# Patient Record
Sex: FEMALE | Race: WHITE | NOT HISPANIC OR LATINO
[De-identification: names, ages, dates, MRNs, and addresses within clinical notes are randomized per-mention and may not be internally consistent; named-entity substitution may affect disease eponyms.]

---

## 2022-04-29 PROBLEM — Z00.00 ENCOUNTER FOR PREVENTIVE HEALTH EXAMINATION: Status: ACTIVE | Noted: 2022-04-29

## 2022-11-28 ENCOUNTER — APPOINTMENT (OUTPATIENT)
Dept: ORTHOPEDIC SURGERY | Facility: CLINIC | Age: 52
End: 2022-11-28

## 2022-11-28 VITALS — HEIGHT: 65 IN | WEIGHT: 215 LBS | BODY MASS INDEX: 35.82 KG/M2

## 2022-11-28 DIAGNOSIS — S49.92XA UNSPECIFIED INJURY OF LEFT SHOULDER AND UPPER ARM, INITIAL ENCOUNTER: ICD-10-CM

## 2022-11-28 PROCEDURE — 99213 OFFICE O/P EST LOW 20 MIN: CPT

## 2022-11-28 PROCEDURE — 73030 X-RAY EXAM OF SHOULDER: CPT | Mod: LT

## 2022-11-28 PROCEDURE — 99203 OFFICE O/P NEW LOW 30 MIN: CPT

## 2022-11-28 RX ORDER — ALLOPURINOL 200 MG/1
TABLET ORAL
Refills: 0 | Status: ACTIVE | COMMUNITY

## 2022-11-28 RX ORDER — CYCLOSPORINE 25 MG/1
CAPSULE, GELATIN COATED ORAL
Refills: 0 | Status: ACTIVE | COMMUNITY

## 2022-11-28 RX ORDER — OMEPRAZOLE MAGNESIUM 20 MG/1
20 TABLET, DELAYED RELEASE ORAL
Refills: 0 | Status: ACTIVE | COMMUNITY

## 2022-11-28 RX ORDER — PREDNISONE 50 MG/1
TABLET ORAL
Refills: 0 | Status: ACTIVE | COMMUNITY

## 2022-11-28 NOTE — IMAGING
[de-identified] :  On examination of her left shoulder she has a small area of ecchymosis, no erythema, mild swelling.  She is nontender over the clavicle or the AC joint.  She is tender over the anterior glenohumeral joint and the greater tuberosity.  She has limited range of motion of the shoulder.  4/5 strength with abduction and adduction, sensation is intact throughout, 2+ radial pulse.\par \par X-rays taken in the office today of the left shoulder show no acute fractures, dislocations, or other bony abnormalities.

## 2022-11-28 NOTE — DISCUSSION/SUMMARY
[de-identified] :  At this time she is going to for continue to rest and ice the shoulder, Tylenol as needed for pain.  I would like to get an MRI of her shoulder to rule out a rotator cuff injury.  I gave her a script for physical therapy.  She can call me after the MRI to go over the results.  Will schedule her follow-up for repeat evaluation. Patient will call me if any other problems or concerns.  Patient verbalized understanding and agreed with the plan, all questions were answered in the office today.\par

## 2022-11-28 NOTE — HISTORY OF PRESENT ILLNESS
[de-identified] :  52-year-old female is here today for evaluation of her left shoulder.  Patient states she fell going into her house landing on her left shoulder and left side.  Since then she has been having pain and difficulty lifting her left arm.  She denies any previous injuries or surgeries on the shoulder.  She denies any pain radiating down the arm.  She denies any numbness or tingling in the arm.  She has only been taking Tylenol for the pain because she is a kidney transplant patient.

## 2022-12-05 ENCOUNTER — APPOINTMENT (OUTPATIENT)
Dept: MRI IMAGING | Facility: CLINIC | Age: 52
End: 2022-12-05

## 2022-12-29 ENCOUNTER — APPOINTMENT (OUTPATIENT)
Dept: ORTHOPEDIC SURGERY | Facility: CLINIC | Age: 52
End: 2022-12-29

## 2023-06-05 ENCOUNTER — INPATIENT (INPATIENT)
Facility: HOSPITAL | Age: 53
LOS: 1 days | Discharge: HOME | DRG: 299 | End: 2023-06-07
Attending: INTERNAL MEDICINE | Admitting: INTERNAL MEDICINE
Payer: MEDICARE

## 2023-06-05 VITALS
TEMPERATURE: 98 F | OXYGEN SATURATION: 98 % | DIASTOLIC BLOOD PRESSURE: 100 MMHG | SYSTOLIC BLOOD PRESSURE: 170 MMHG | WEIGHT: 220.02 LBS | HEART RATE: 110 BPM | HEIGHT: 66 IN | RESPIRATION RATE: 18 BRPM

## 2023-06-05 DIAGNOSIS — R55 SYNCOPE AND COLLAPSE: ICD-10-CM

## 2023-06-05 DIAGNOSIS — Z96.652 PRESENCE OF LEFT ARTIFICIAL KNEE JOINT: Chronic | ICD-10-CM

## 2023-06-05 DIAGNOSIS — Z94.0 KIDNEY TRANSPLANT STATUS: Chronic | ICD-10-CM

## 2023-06-05 DIAGNOSIS — Z96.651 PRESENCE OF RIGHT ARTIFICIAL KNEE JOINT: Chronic | ICD-10-CM

## 2023-06-05 LAB
ALBUMIN SERPL ELPH-MCNC: 4 G/DL — SIGNIFICANT CHANGE UP (ref 3.5–5.2)
ALP SERPL-CCNC: 98 U/L — SIGNIFICANT CHANGE UP (ref 30–115)
ALT FLD-CCNC: 18 U/L — SIGNIFICANT CHANGE UP (ref 0–41)
ANION GAP SERPL CALC-SCNC: 16 MMOL/L — HIGH (ref 7–14)
ANION GAP SERPL CALC-SCNC: 23 MMOL/L — HIGH (ref 7–14)
APPEARANCE UR: CLEAR — SIGNIFICANT CHANGE UP
APTT BLD: 25.6 SEC — LOW (ref 27–39.2)
APTT BLD: 25.9 SEC — LOW (ref 27–39.2)
AST SERPL-CCNC: 46 U/L — HIGH (ref 0–41)
BACTERIA # UR AUTO: ABNORMAL
BASE EXCESS BLDV CALC-SCNC: -10.3 MMOL/L — LOW (ref -2–3)
BASOPHILS # BLD AUTO: 0.03 K/UL — SIGNIFICANT CHANGE UP (ref 0–0.2)
BASOPHILS NFR BLD AUTO: 0.3 % — SIGNIFICANT CHANGE UP (ref 0–1)
BILIRUB SERPL-MCNC: 1.2 MG/DL — SIGNIFICANT CHANGE UP (ref 0.2–1.2)
BILIRUB UR-MCNC: NEGATIVE — SIGNIFICANT CHANGE UP
BUN SERPL-MCNC: 35 MG/DL — HIGH (ref 10–20)
BUN SERPL-MCNC: 36 MG/DL — HIGH (ref 10–20)
BUN SERPL-MCNC: 37 MG/DL — HIGH (ref 10–20)
BUN SERPL-MCNC: 40 MG/DL — HIGH (ref 10–20)
CA-I SERPL-SCNC: 1.23 MMOL/L — SIGNIFICANT CHANGE UP (ref 1.15–1.33)
CALCIUM SERPL-MCNC: 9.1 MG/DL — SIGNIFICANT CHANGE UP (ref 8.4–10.5)
CALCIUM SERPL-MCNC: 9.3 MG/DL — SIGNIFICANT CHANGE UP (ref 8.4–10.5)
CALCIUM SERPL-MCNC: 9.5 MG/DL — SIGNIFICANT CHANGE UP (ref 8.4–10.5)
CALCIUM SERPL-MCNC: 9.6 MG/DL — SIGNIFICANT CHANGE UP (ref 8.4–10.4)
CHLORIDE SERPL-SCNC: 100 MMOL/L — SIGNIFICANT CHANGE UP (ref 98–110)
CHLORIDE SERPL-SCNC: 100 MMOL/L — SIGNIFICANT CHANGE UP (ref 98–110)
CHLORIDE SERPL-SCNC: 103 MMOL/L — SIGNIFICANT CHANGE UP (ref 98–110)
CHLORIDE SERPL-SCNC: 98 MMOL/L — SIGNIFICANT CHANGE UP (ref 98–110)
CK SERPL-CCNC: 369 U/L — HIGH (ref 0–225)
CO2 SERPL-SCNC: 13 MMOL/L — LOW (ref 17–32)
CO2 SERPL-SCNC: 16 MMOL/L — LOW (ref 17–32)
CO2 SERPL-SCNC: 20 MMOL/L — SIGNIFICANT CHANGE UP (ref 17–32)
CO2 SERPL-SCNC: 22 MMOL/L — SIGNIFICANT CHANGE UP (ref 17–32)
COLOR SPEC: YELLOW — SIGNIFICANT CHANGE UP
CREAT SERPL-MCNC: 2.3 MG/DL — HIGH (ref 0.7–1.5)
CREAT SERPL-MCNC: 2.3 MG/DL — HIGH (ref 0.7–1.5)
CREAT SERPL-MCNC: 2.5 MG/DL — HIGH (ref 0.7–1.5)
CREAT SERPL-MCNC: 2.5 MG/DL — HIGH (ref 0.7–1.5)
D DIMER BLD IA.RAPID-MCNC: 857 NG/ML DDU — HIGH
DIFF PNL FLD: ABNORMAL
EGFR: 23 ML/MIN/1.73M2 — LOW
EGFR: 23 ML/MIN/1.73M2 — LOW
EGFR: 25 ML/MIN/1.73M2 — LOW
EGFR: 25 ML/MIN/1.73M2 — LOW
EOSINOPHIL # BLD AUTO: 0.01 K/UL — SIGNIFICANT CHANGE UP (ref 0–0.7)
EOSINOPHIL NFR BLD AUTO: 0.1 % — SIGNIFICANT CHANGE UP (ref 0–8)
EPI CELLS # UR: 1 /HPF — SIGNIFICANT CHANGE UP (ref 0–5)
ETHANOL SERPL-MCNC: <10 MG/DL — SIGNIFICANT CHANGE UP
GAS PNL BLDV: 131 MMOL/L — LOW (ref 136–145)
GAS PNL BLDV: SIGNIFICANT CHANGE UP
GLUCOSE SERPL-MCNC: 101 MG/DL — HIGH (ref 70–99)
GLUCOSE SERPL-MCNC: 145 MG/DL — HIGH (ref 70–99)
GLUCOSE SERPL-MCNC: 84 MG/DL — SIGNIFICANT CHANGE UP (ref 70–99)
GLUCOSE SERPL-MCNC: 85 MG/DL — SIGNIFICANT CHANGE UP (ref 70–99)
GLUCOSE UR QL: NEGATIVE — SIGNIFICANT CHANGE UP
HCO3 BLDV-SCNC: 16 MMOL/L — LOW (ref 22–29)
HCT VFR BLD CALC: 35.2 % — LOW (ref 37–47)
HCT VFR BLDA CALC: 36 % — LOW (ref 39–51)
HGB BLD CALC-MCNC: 11.9 G/DL — LOW (ref 12.6–17.4)
HGB BLD-MCNC: 11.4 G/DL — LOW (ref 12–16)
IMM GRANULOCYTES NFR BLD AUTO: 0.6 % — HIGH (ref 0.1–0.3)
INR BLD: 0.93 RATIO — SIGNIFICANT CHANGE UP (ref 0.65–1.3)
INR BLD: 0.97 RATIO — SIGNIFICANT CHANGE UP (ref 0.65–1.3)
KETONES UR-MCNC: ABNORMAL
LACTATE BLDV-MCNC: 4.9 MMOL/L — CRITICAL HIGH (ref 0.5–2)
LACTATE SERPL-SCNC: 1 MMOL/L — SIGNIFICANT CHANGE UP (ref 0.7–2)
LACTATE SERPL-SCNC: 1.8 MMOL/L — SIGNIFICANT CHANGE UP (ref 0.7–2)
LEUKOCYTE ESTERASE UR-ACNC: NEGATIVE — SIGNIFICANT CHANGE UP
LYMPHOCYTES # BLD AUTO: 1.24 K/UL — SIGNIFICANT CHANGE UP (ref 1.2–3.4)
LYMPHOCYTES # BLD AUTO: 14.3 % — LOW (ref 20.5–51.1)
MCHC RBC-ENTMCNC: 32.4 G/DL — SIGNIFICANT CHANGE UP (ref 32–37)
MCHC RBC-ENTMCNC: 33.7 PG — HIGH (ref 27–31)
MCV RBC AUTO: 104.1 FL — HIGH (ref 81–99)
MONOCYTES # BLD AUTO: 0.35 K/UL — SIGNIFICANT CHANGE UP (ref 0.1–0.6)
MONOCYTES NFR BLD AUTO: 4 % — SIGNIFICANT CHANGE UP (ref 1.7–9.3)
NEUTROPHILS # BLD AUTO: 7.02 K/UL — HIGH (ref 1.4–6.5)
NEUTROPHILS NFR BLD AUTO: 80.7 % — HIGH (ref 42.2–75.2)
NITRITE UR-MCNC: NEGATIVE — SIGNIFICANT CHANGE UP
NRBC # BLD: 0 /100 WBCS — SIGNIFICANT CHANGE UP (ref 0–0)
PCO2 BLDV: 37 MMHG — LOW (ref 39–42)
PH BLDV: 7.25 — LOW (ref 7.32–7.43)
PH UR: 6 — SIGNIFICANT CHANGE UP (ref 5–8)
PLATELET # BLD AUTO: 279 K/UL — SIGNIFICANT CHANGE UP (ref 130–400)
PMV BLD: 10.9 FL — HIGH (ref 7.4–10.4)
PO2 BLDV: 37 MMHG — SIGNIFICANT CHANGE UP
POTASSIUM BLDV-SCNC: 5.8 MMOL/L — HIGH (ref 3.5–5.1)
POTASSIUM SERPL-MCNC: 4.3 MMOL/L — SIGNIFICANT CHANGE UP (ref 3.5–5)
POTASSIUM SERPL-MCNC: 4.7 MMOL/L — SIGNIFICANT CHANGE UP (ref 3.5–5)
POTASSIUM SERPL-MCNC: 5.6 MMOL/L — HIGH (ref 3.5–5)
POTASSIUM SERPL-MCNC: 5.6 MMOL/L — HIGH (ref 3.5–5)
POTASSIUM SERPL-SCNC: 4.3 MMOL/L — SIGNIFICANT CHANGE UP (ref 3.5–5)
POTASSIUM SERPL-SCNC: 4.7 MMOL/L — SIGNIFICANT CHANGE UP (ref 3.5–5)
POTASSIUM SERPL-SCNC: 5.6 MMOL/L — HIGH (ref 3.5–5)
POTASSIUM SERPL-SCNC: 5.6 MMOL/L — HIGH (ref 3.5–5)
PROT SERPL-MCNC: 7.3 G/DL — SIGNIFICANT CHANGE UP (ref 6–8)
PROT UR-MCNC: ABNORMAL
PROTHROM AB SERPL-ACNC: 10.6 SEC — SIGNIFICANT CHANGE UP (ref 9.95–12.87)
PROTHROM AB SERPL-ACNC: 11 SEC — SIGNIFICANT CHANGE UP (ref 9.95–12.87)
RBC # BLD: 3.38 M/UL — LOW (ref 4.2–5.4)
RBC # FLD: 15.1 % — HIGH (ref 11.5–14.5)
RBC CASTS # UR COMP ASSIST: 5 /HPF — HIGH (ref 0–4)
SAO2 % BLDV: 53.4 % — SIGNIFICANT CHANGE UP
SODIUM SERPL-SCNC: 134 MMOL/L — LOW (ref 135–146)
SODIUM SERPL-SCNC: 135 MMOL/L — SIGNIFICANT CHANGE UP (ref 135–146)
SODIUM SERPL-SCNC: 136 MMOL/L — SIGNIFICANT CHANGE UP (ref 135–146)
SODIUM SERPL-SCNC: 138 MMOL/L — SIGNIFICANT CHANGE UP (ref 135–146)
SP GR SPEC: 1.02 — SIGNIFICANT CHANGE UP (ref 1.01–1.03)
TROPONIN T SERPL-MCNC: 0.02 NG/ML — HIGH
TROPONIN T SERPL-MCNC: <0.01 NG/ML — SIGNIFICANT CHANGE UP
UROBILINOGEN FLD QL: SIGNIFICANT CHANGE UP
WBC # BLD: 8.7 K/UL — SIGNIFICANT CHANGE UP (ref 4.8–10.8)
WBC # FLD AUTO: 8.7 K/UL — SIGNIFICANT CHANGE UP (ref 4.8–10.8)
WBC UR QL: 1 /HPF — SIGNIFICANT CHANGE UP (ref 0–5)

## 2023-06-05 PROCEDURE — 97162 PT EVAL MOD COMPLEX 30 MIN: CPT | Mod: GP

## 2023-06-05 PROCEDURE — A9540: CPT

## 2023-06-05 PROCEDURE — 97167 OT EVAL HIGH COMPLEX 60 MIN: CPT | Mod: GO

## 2023-06-05 PROCEDURE — 73600 X-RAY EXAM OF ANKLE: CPT | Mod: 26,LT

## 2023-06-05 PROCEDURE — 72170 X-RAY EXAM OF PELVIS: CPT | Mod: 26

## 2023-06-05 PROCEDURE — 73562 X-RAY EXAM OF KNEE 3: CPT | Mod: 26,LT

## 2023-06-05 PROCEDURE — 85730 THROMBOPLASTIN TIME PARTIAL: CPT

## 2023-06-05 PROCEDURE — 36415 COLL VENOUS BLD VENIPUNCTURE: CPT

## 2023-06-05 PROCEDURE — 85025 COMPLETE CBC W/AUTO DIFF WBC: CPT

## 2023-06-05 PROCEDURE — 73030 X-RAY EXAM OF SHOULDER: CPT | Mod: 26,LT

## 2023-06-05 PROCEDURE — 86900 BLOOD TYPING SEROLOGIC ABO: CPT

## 2023-06-05 PROCEDURE — A9567: CPT

## 2023-06-05 PROCEDURE — 85610 PROTHROMBIN TIME: CPT

## 2023-06-05 PROCEDURE — 85027 COMPLETE CBC AUTOMATED: CPT

## 2023-06-05 PROCEDURE — 70450 CT HEAD/BRAIN W/O DYE: CPT | Mod: 26,MA

## 2023-06-05 PROCEDURE — 93010 ELECTROCARDIOGRAM REPORT: CPT

## 2023-06-05 PROCEDURE — 73060 X-RAY EXAM OF HUMERUS: CPT | Mod: 26,LT

## 2023-06-05 PROCEDURE — 80048 BASIC METABOLIC PNL TOTAL CA: CPT

## 2023-06-05 PROCEDURE — 84484 ASSAY OF TROPONIN QUANT: CPT

## 2023-06-05 PROCEDURE — 99223 1ST HOSP IP/OBS HIGH 75: CPT | Mod: GC

## 2023-06-05 PROCEDURE — 80053 COMPREHEN METABOLIC PANEL: CPT

## 2023-06-05 PROCEDURE — 99285 EMERGENCY DEPT VISIT HI MDM: CPT

## 2023-06-05 PROCEDURE — 85379 FIBRIN DEGRADATION QUANT: CPT

## 2023-06-05 PROCEDURE — 93970 EXTREMITY STUDY: CPT | Mod: 26

## 2023-06-05 PROCEDURE — 78582 LUNG VENTILAT&PERFUS IMAGING: CPT

## 2023-06-05 PROCEDURE — 99497 ADVNCD CARE PLAN 30 MIN: CPT | Mod: 25

## 2023-06-05 PROCEDURE — 93010 ELECTROCARDIOGRAM REPORT: CPT | Mod: 77

## 2023-06-05 PROCEDURE — 83735 ASSAY OF MAGNESIUM: CPT

## 2023-06-05 PROCEDURE — 93306 TTE W/DOPPLER COMPLETE: CPT

## 2023-06-05 PROCEDURE — 93005 ELECTROCARDIOGRAM TRACING: CPT

## 2023-06-05 PROCEDURE — 86850 RBC ANTIBODY SCREEN: CPT

## 2023-06-05 PROCEDURE — 78582 LUNG VENTILAT&PERFUS IMAGING: CPT | Mod: 26

## 2023-06-05 PROCEDURE — 80307 DRUG TEST PRSMV CHEM ANLYZR: CPT

## 2023-06-05 PROCEDURE — 86901 BLOOD TYPING SEROLOGIC RH(D): CPT

## 2023-06-05 PROCEDURE — 95819 EEG AWAKE AND ASLEEP: CPT

## 2023-06-05 PROCEDURE — 71045 X-RAY EXAM CHEST 1 VIEW: CPT | Mod: 26

## 2023-06-05 PROCEDURE — 82550 ASSAY OF CK (CPK): CPT

## 2023-06-05 PROCEDURE — 93970 EXTREMITY STUDY: CPT

## 2023-06-05 PROCEDURE — 83605 ASSAY OF LACTIC ACID: CPT

## 2023-06-05 PROCEDURE — 82962 GLUCOSE BLOOD TEST: CPT

## 2023-06-05 RX ORDER — HEPARIN SODIUM 5000 [USP'U]/ML
INJECTION INTRAVENOUS; SUBCUTANEOUS
Qty: 25000 | Refills: 0 | Status: DISCONTINUED | OUTPATIENT
Start: 2023-06-05 | End: 2023-06-06

## 2023-06-05 RX ORDER — LISINOPRIL 2.5 MG/1
1 TABLET ORAL
Refills: 0 | DISCHARGE

## 2023-06-05 RX ORDER — HEPARIN SODIUM 5000 [USP'U]/ML
8000 INJECTION INTRAVENOUS; SUBCUTANEOUS ONCE
Refills: 0 | Status: COMPLETED | OUTPATIENT
Start: 2023-06-05 | End: 2023-06-05

## 2023-06-05 RX ORDER — CYCLOSPORINE 100 MG/1
100 CAPSULE ORAL AT BEDTIME
Refills: 0 | Status: DISCONTINUED | OUTPATIENT
Start: 2023-06-05 | End: 2023-06-07

## 2023-06-05 RX ORDER — SODIUM CHLORIDE 9 MG/ML
1000 INJECTION, SOLUTION INTRAVENOUS ONCE
Refills: 0 | Status: COMPLETED | OUTPATIENT
Start: 2023-06-05 | End: 2023-06-05

## 2023-06-05 RX ORDER — CYCLOSPORINE 100 MG/1
50 CAPSULE ORAL DAILY
Refills: 0 | Status: DISCONTINUED | OUTPATIENT
Start: 2023-06-05 | End: 2023-06-07

## 2023-06-05 RX ORDER — PREDNISOLONE 5 MG
1 TABLET ORAL
Refills: 0 | DISCHARGE

## 2023-06-05 RX ORDER — SODIUM CHLORIDE 9 MG/ML
1000 INJECTION, SOLUTION INTRAVENOUS
Refills: 0 | Status: DISCONTINUED | OUTPATIENT
Start: 2023-06-05 | End: 2023-06-07

## 2023-06-05 RX ORDER — HEPARIN SODIUM 5000 [USP'U]/ML
4000 INJECTION INTRAVENOUS; SUBCUTANEOUS EVERY 6 HOURS
Refills: 0 | Status: DISCONTINUED | OUTPATIENT
Start: 2023-06-05 | End: 2023-06-06

## 2023-06-05 RX ORDER — HEPARIN SODIUM 5000 [USP'U]/ML
5000 INJECTION INTRAVENOUS; SUBCUTANEOUS EVERY 12 HOURS
Refills: 0 | Status: DISCONTINUED | OUTPATIENT
Start: 2023-06-05 | End: 2023-06-05

## 2023-06-05 RX ORDER — MORPHINE SULFATE 50 MG/1
4 CAPSULE, EXTENDED RELEASE ORAL ONCE
Refills: 0 | Status: DISCONTINUED | OUTPATIENT
Start: 2023-06-05 | End: 2023-06-05

## 2023-06-05 RX ORDER — CYCLOSPORINE 100 MG/1
1 CAPSULE ORAL
Refills: 0 | DISCHARGE

## 2023-06-05 RX ORDER — ALLOPURINOL 300 MG
1 TABLET ORAL
Refills: 0 | DISCHARGE

## 2023-06-05 RX ORDER — LISINOPRIL 2.5 MG/1
5 TABLET ORAL DAILY
Refills: 0 | Status: DISCONTINUED | OUTPATIENT
Start: 2023-06-05 | End: 2023-06-05

## 2023-06-05 RX ORDER — NIFEDIPINE 30 MG
60 TABLET, EXTENDED RELEASE 24 HR ORAL DAILY
Refills: 0 | Status: DISCONTINUED | OUTPATIENT
Start: 2023-06-05 | End: 2023-06-06

## 2023-06-05 RX ORDER — OMEPRAZOLE 10 MG/1
1 CAPSULE, DELAYED RELEASE ORAL
Refills: 0 | DISCHARGE

## 2023-06-05 RX ORDER — SODIUM ZIRCONIUM CYCLOSILICATE 10 G/10G
10 POWDER, FOR SUSPENSION ORAL ONCE
Refills: 0 | Status: COMPLETED | OUTPATIENT
Start: 2023-06-05 | End: 2023-06-05

## 2023-06-05 RX ORDER — ALLOPURINOL 300 MG
300 TABLET ORAL DAILY
Refills: 0 | Status: DISCONTINUED | OUTPATIENT
Start: 2023-06-05 | End: 2023-06-07

## 2023-06-05 RX ORDER — SODIUM CHLORIDE 9 MG/ML
1000 INJECTION INTRAMUSCULAR; INTRAVENOUS; SUBCUTANEOUS ONCE
Refills: 0 | Status: COMPLETED | OUTPATIENT
Start: 2023-06-05 | End: 2023-06-05

## 2023-06-05 RX ORDER — SODIUM BICARBONATE 1 MEQ/ML
650 SYRINGE (ML) INTRAVENOUS THREE TIMES A DAY
Refills: 0 | Status: DISCONTINUED | OUTPATIENT
Start: 2023-06-05 | End: 2023-06-07

## 2023-06-05 RX ORDER — FLUVASTATIN SODIUM 80 MG/1
1 TABLET, FILM COATED, EXTENDED RELEASE ORAL
Refills: 0 | DISCHARGE

## 2023-06-05 RX ORDER — ACETAMINOPHEN 500 MG
650 TABLET ORAL ONCE
Refills: 0 | Status: COMPLETED | OUTPATIENT
Start: 2023-06-05 | End: 2023-06-05

## 2023-06-05 RX ORDER — ATORVASTATIN CALCIUM 80 MG/1
10 TABLET, FILM COATED ORAL AT BEDTIME
Refills: 0 | Status: DISCONTINUED | OUTPATIENT
Start: 2023-06-05 | End: 2023-06-07

## 2023-06-05 RX ORDER — KETOROLAC TROMETHAMINE 30 MG/ML
15 SYRINGE (ML) INJECTION ONCE
Refills: 0 | Status: DISCONTINUED | OUTPATIENT
Start: 2023-06-05 | End: 2023-06-05

## 2023-06-05 RX ORDER — PANTOPRAZOLE SODIUM 20 MG/1
40 TABLET, DELAYED RELEASE ORAL
Refills: 0 | Status: DISCONTINUED | OUTPATIENT
Start: 2023-06-05 | End: 2023-06-07

## 2023-06-05 RX ORDER — HEPARIN SODIUM 5000 [USP'U]/ML
8000 INJECTION INTRAVENOUS; SUBCUTANEOUS EVERY 6 HOURS
Refills: 0 | Status: DISCONTINUED | OUTPATIENT
Start: 2023-06-05 | End: 2023-06-06

## 2023-06-05 RX ORDER — ENOXAPARIN SODIUM 100 MG/ML
40 INJECTION SUBCUTANEOUS EVERY 24 HOURS
Refills: 0 | Status: DISCONTINUED | OUTPATIENT
Start: 2023-06-05 | End: 2023-06-05

## 2023-06-05 RX ADMIN — SODIUM CHLORIDE 1000 MILLILITER(S): 9 INJECTION INTRAMUSCULAR; INTRAVENOUS; SUBCUTANEOUS at 14:29

## 2023-06-05 RX ADMIN — HEPARIN SODIUM 5000 UNIT(S): 5000 INJECTION INTRAVENOUS; SUBCUTANEOUS at 18:24

## 2023-06-05 RX ADMIN — Medication 650 MILLIGRAM(S): at 10:46

## 2023-06-05 RX ADMIN — ATORVASTATIN CALCIUM 10 MILLIGRAM(S): 80 TABLET, FILM COATED ORAL at 23:41

## 2023-06-05 RX ADMIN — MORPHINE SULFATE 4 MILLIGRAM(S): 50 CAPSULE, EXTENDED RELEASE ORAL at 07:05

## 2023-06-05 RX ADMIN — MORPHINE SULFATE 4 MILLIGRAM(S): 50 CAPSULE, EXTENDED RELEASE ORAL at 06:50

## 2023-06-05 RX ADMIN — SODIUM ZIRCONIUM CYCLOSILICATE 10 GRAM(S): 10 POWDER, FOR SUSPENSION ORAL at 14:36

## 2023-06-05 RX ADMIN — HEPARIN SODIUM 1800 UNIT(S)/HR: 5000 INJECTION INTRAVENOUS; SUBCUTANEOUS at 23:05

## 2023-06-05 RX ADMIN — HEPARIN SODIUM 8000 UNIT(S): 5000 INJECTION INTRAVENOUS; SUBCUTANEOUS at 23:05

## 2023-06-05 RX ADMIN — Medication 650 MILLIGRAM(S): at 11:30

## 2023-06-05 RX ADMIN — SODIUM CHLORIDE 75 MILLILITER(S): 9 INJECTION, SOLUTION INTRAVENOUS at 10:45

## 2023-06-05 NOTE — ED PROVIDER NOTE - PHYSICAL EXAMINATION
CONSTITUTIONAL: Well-developed; well-nourished; in no acute distress.   SKIN: Warm, dry  HEAD: Normocephalic; Left upper eyelid ecchymosis. Left maxillary abrasion, nontender.  EYES: PERRL, EOMI, normal sclera and conjunctiva   ENT: No nasal discharge; airway clear.  NECK: Supple; non tender.  CARD:  Regular rate and rhythm. Normal S1, S2. No LE edema. 2+ radial  RESP: No increased WOB. CTA b/l without wheezes, crackles, rhonchi  ABD: Normoactive BS. Soft, nontender, nondistended.  EXT: Left anterior shoulder ttp, ROM limited 2/2 pain. Left hip ttp, FROM, neg log roll, no deformities. Left knee FROM, nontender. Left ankle FROM, nontender, lateral abrasion. 2+ PT, DP pulses.  NEURO: AAO x 3, normal speech, no facial asymmetry, negative pronator drift, no ataxia, negative Romberg, no dysdiadokinesia, no nystagmus, peripheral vision intact, sensory equal and intact.  PSYCH: Cooperative, appropriate.

## 2023-06-05 NOTE — H&P ADULT - HISTORY OF PRESENT ILLNESS
52 y/o F with PMH HTN, hx kidney transplant on cyclosporine and prednisone, gout, htn, hx b/l hip replacement and left knee replacement BIBEMS after a fall at home. Patient was at baseline until this am when she woke up to walk to the bathroom and had a fall. Patients mother who lives there heard a noise and went upstairs and found the patient on the floor unresponsive for about 20mins at which time the EMS arrived. Patient was responding to the EMS but still confused(FS 140mg/dl by EMS). Patient was brought to the hospital and became aaox3 here without any intervention. Patient dosent remember what happened after she woke up to go to the bathroom. Patient mentioned she had about 3 episodes of loose stools few days ago that resolved by itself and was feeling at baseline until this am. No Palpitations/chest pain/Focal weakness/recent illness/nausea/vomiting/fevers/chills/decreased intake/sick contacts/dysuria/abd pain/chest pain/swelling of legs/face.    In the ED  /100, HR 100bpm, RR 18, Temp 98.4, RR 18, Sao2 98% on RA  Labs - Hb 11.4 , Na 134, K 5.6(hemolyzed) repeat K on vbg 5.8, Bicarb 13, Cr 2.5(as per patient Cr 2.5 since admitted for gout last year at outside hospital), BUN 37, ast 46, VBG lactate 4.9, Ph 7.25, pco2 37, bicarb 16, po2 37  CT head - No CT evidence for acute intracranial hemorrhage or acute large   territorial infarct.  Xray pelvis, knee, ankle:  Pelvis  No acute fracture or acute articular abnormality. The patient is post   bilateral hip arthroplasty with partially included hardware appearing in   good position. Partially calcified mass is noted in the right pelvis   measuring up to 5.7 cm. Surgical clips are noted as well.    Left shoulder  There is widening of the acromioclavicular joint raising a question of   separation. The glenohumeral joint appears maintained. No acute fracture   is seen. Telemetry leads    Left humerus  No acute fracture or acute articular abnormality. Several leads are   projecting over the distal humerus.    Left ankle  No acute fracture or acute articular abnormality. Bony productive changes   of the ankle joint. Otherwise, the ankle mortise is maintained. Midfoot   degenerative changes.    Left knee  The patient is post total left knee arthroplasty with hardware appearing   in good position. No evidence of knee joint effusion. Multiple ossified   structures are noted posterior to the knee joint compatible with   osteochondromatosis    Patient was given 1lit NS bolus(1lit LR shows as complete but as per patient she refused), morphine 4mg x 1, tylenol and admitted to tele

## 2023-06-05 NOTE — ED PROVIDER NOTE - PROGRESS NOTE DETAILS
TJY: pt says her Cr normally runs in the range of 2. She is unable to have any active ROM, pain with abduction of shoulder.     PMHx: congential kidney dz, s/p transplant; pt had avascular necrosis of hip and knees requiring replacement.

## 2023-06-05 NOTE — ED PROCEDURE NOTE - NS ED PERI VASCULAR NEG
fingers/toes warm to touch/no paresthesia/no swelling/no cyanosis of extremity/capillary refill time < 2 seconds
no swelling/no cyanosis of extremity/capillary refill time < 2 seconds

## 2023-06-05 NOTE — H&P ADULT - CONVERSATION DETAILS
The Goals of care and preferences and wishes were discussed with patient. Discussed in detail possible prognosis and treatment options reviewed as best as possible .   Resuscitation measures explained and patient's wishes discussed.     patient wants full resuscitation measures    FULL CODE

## 2023-06-05 NOTE — ED PROVIDER NOTE - CLINICAL SUMMARY MEDICAL DECISION MAKING FREE TEXT BOX
Received from signout.  Stable.  Pending full trauma imaging.  (+) AC separation noted on shoulder XRay.  EKG non ischemic.  ADMIT TELE for syncope workup.

## 2023-06-05 NOTE — H&P ADULT - NSHPLABSRESULTS_GEN_ALL_CORE
Labs - Hb 11.4 , Na 134, K 5.6(hemolyzed) repeat K on vbg 5.8, Bicarb 13, Cr 2.5(as per patient Cr 2.5 since admitted for gout last year at outside hospital), BUN 37, ast 46, VBG lactate 4.9, Ph 7.25, pco2 37, bicarb 16, po2 37  CT head - No CT evidence for acute intracranial hemorrhage or acute large   territorial infarct.  Xray pelvis, knee, ankle:  Pelvis  No acute fracture or acute articular abnormality. The patient is post   bilateral hip arthroplasty with partially included hardware appearing in   good position. Partially calcified mass is noted in the right pelvis   measuring up to 5.7 cm. Surgical clips are noted as well.    Left shoulder  There is widening of the acromioclavicular joint raising a question of   separation. The glenohumeral joint appears maintained. No acute fracture   is seen. Telemetry leads    Left humerus  No acute fracture or acute articular abnormality. Several leads are   projecting over the distal humerus.    Left ankle  No acute fracture or acute articular abnormality. Bony productive changes   of the ankle joint. Otherwise, the ankle mortise is maintained. Midfoot   degenerative changes.    Left knee  The patient is post total left knee arthroplasty with hardware appearing   in good position. No evidence of knee joint effusion. Multiple ossified   structures are noted posterior to the knee joint compatible with   osteochondromatosis

## 2023-06-05 NOTE — ED ADULT NURSE NOTE - OBJECTIVE STATEMENT
pt syncopized, falling on her left side. C/o pain in left shoulder, left knee and left outter ankle. Pt unsure of what happen, does not remember. As per parents, they heard a "BOOM" sound, went running and found her on floor. Parents state pt was unresponsive for 15 mins. When [employer] got there she woke up. As per EMS pt slightly confused.

## 2023-06-05 NOTE — ED PROVIDER NOTE - ATTENDING CONTRIBUTION TO CARE
53-year-old female presents to the ED for confusion that began at 11 PM last night.  Reported by patient's mother at bedside.  Patient was evaluated by me noted to be alert and oriented x2 to name and place.  Some delay in responding the time.  No focal neurological deficits noted.  We obtained labs, CT imaging and UA.  Vital signs patient noted to be tachycardic and hypertensive. 53-year-old female presents to the ED for confusion that began at 11 PM last night.  Reported by patient's mother at bedside.  Patient was evaluated by me noted to be alert and oriented x2 to name and place.  Some delay in responding the time.  No focal neurological deficits noted.  We obtained labs, CT imaging and UA.  Vital signs patient noted to be tachycardic and hypertensive.    Case was signed out pending CT imaging labs and final disposition.

## 2023-06-05 NOTE — ED PROVIDER NOTE - OBJECTIVE STATEMENT
52 y/o F with 52 y/o F with PMH HTN, hx kidney transplant on cyclosporine and prednisone, hx b/l hip replacement and left knee replacement BIBEMS, accompanied by mother for syncopal episode around 5am. Per EMS on scene pt laying on her left side and AOx1. Pt's mother reports pt was behaving normally when she went to bed at 11pm. Pt states she remembers waking up this morning to go to the bathroom and does not remember anything else; c/o pain of left shoulder, hip, and knee. Pt states she has been feeling well; denies recent fever, chest pain, SOB, abd pain, vomiting, diarrhea, dysuria, hematuria

## 2023-06-05 NOTE — H&P ADULT - ASSESSMENT
54 y/o F with PMH HTN, hx kidney transplant on cyclosporine and prednisone, Gout, Htn, hx b/l hip replacement and left knee replacement BIBEMS after a fall at home. Admitted for syncope workup and Acidosis/hyperkalemia on labs.     #Syncope cardiac vs vasovagal   - s/p fall  - unresponsive for ~20mins as per patients mother and confused at scene when EMS arrived  - no reported seizure activity  - no complaints of palpitations/chest pain/sob/dizziness/blurred vision/focal weakness(apart from pain limiting motion now)  - tachy on admission ekg sinus tach    Plan  - monitor under tele  - orthostatics  - TTE  - Follow up dimer(tachy on admission, no sob, no unilateral LE swelling, r/o PE)  - fall precautions    #?DARCI with metabolic acidosis(Ph 7.25, bicarb 13), hyperkalemia(5.6), and lactic acidosis(4.3)  - s/p renal transplant in 20s   - as per pt baseline Cr ~2.5 since last year when she got admitted for gout attack at an outside hospital  - No nausea/vomiting/abd pain/dysuria/hematuria  - UA +ve protenuria 300mg/dl, ketones +ve, large blood, rbc 5/hpf, occasional bacteria  - s/p 1 lit NS in ED  - continue with LR @75cc/hr  - po bicarb 650mg tid for now  - repeat bmp and lactate in evening s/p fluids and lokelma  - continue with cyclosporin 50mg in am, 100mg bedtime and prednisone 5mg qd  - nephro consult    #Htn  - elevated on admission 170/100,   - no headache/chest pain/vision changes/sob  - resume lisinopril 5mg qd    #Gout   - no recent flares  - resume allopurinol 300mg qd      DVT - Lovenox sq  Diet - dash low potassium diet  GI prophylaxis - takes Prilosec at home  Activity - AAT    Pending - Repeat bmp, lactate, nephro consult, syncope workup, BP control 52 y/o F with PMH HTN, hx kidney transplant on cyclosporine and prednisone, Gout, Htn, hx b/l hip replacement and left knee replacement BIBEMS after a fall at home. Admitted for syncope workup and Acidosis/hyperkalemia on labs.     #Syncope cardiac vs vasovagal   - s/p fall  - unresponsive for ~20mins as per patients mother and confused at scene when EMS arrived  - no reported seizure activity  - no complaints of palpitations/chest pain/sob/dizziness/blurred vision/focal weakness(apart from pain limiting motion now)  - tachy on admission ekg sinus tach    Plan  - monitor under tele  - orthostatics  - TTE  - Follow up dimer(tachy on admission, no sob, no unilateral LE swelling, r/o PE)  - fall precautions    #?DARCI with metabolic acidosis(Ph 7.25, bicarb 13), hyperkalemia(5.6), and lactic acidosis(4.3)  - s/p renal transplant in 20s   - as per pt baseline Cr ~2.5 since last year when she got admitted for gout attack at an outside hospital  - No nausea/vomiting/abd pain/dysuria/hematuria  - UA +ve protenuria 300mg/dl, ketones +ve, large blood, rbc 5/hpf, occasional bacteria  - s/p 1 lit NS in ED  - continue with LR @75cc/hr  - po bicarb 650mg tid for now  - repeat bmp and lactate in evening s/p fluids and lokelma  - continue with cyclosporin 50mg in am, 100mg bedtime and prednisone 5mg qd  - nephro consult    #Htn  - elevated on admission 170/100,   - no headache/chest pain/vision changes/sob  - resume lisinopril 5mg qd    #Gout   - no recent flares  - resume allopurinol 300mg qd      DVT - Lovenox sq  Diet - dash low potassium diet  GI prophylaxis - takes Prilosec at home  Activity - AAT    Verified medrec with patient and mother at bedside, patients pharmacy [facility] [address] and [facility] [address]    Pending - Repeat bmp, lactate, nephro consult, syncope workup, BP control 54 y/o F with PMH HTN, hx kidney transplant on cyclosporine and prednisone, Gout, Htn, hx b/l hip replacement and left knee replacement BIBEMS after a fall at home. Admitted for syncope workup and Acidosis/hyperkalemia on labs.     #Syncope cardiac vs vasovagal   - s/p fall  - unresponsive for ~20mins as per patients mother and confused at scene when EMS arrived  - no reported seizure activity  - no complaints of palpitations/chest pain/sob/dizziness/blurred vision/focal weakness(apart from pain limiting motion now)  - tachy on admission ekg sinus tach, t wave inversion lead 3, and r heart strain     Plan  - monitor under tele  - orthostatics  - TTE  - Follow up dimer, duplex LE (tachy on admission, no sob, no unilateral LE swelling, Recent travel to [facility] r/o PE)--->get VQ scan if dimer elevated  - fall precautions    #?DARCI with metabolic acidosis(Ph 7.25, bicarb 13), hyperkalemia(5.6), and lactic acidosis(4.3)  - s/p renal transplant in 20s   - as per pt baseline Cr ~2.5 since last year when she got admitted for gout attack at an outside hospital  - No nausea/vomiting/abd pain/dysuria/hematuria  - UA +ve protenuria 300mg/dl, ketones +ve, large blood, rbc 5/hpf, occasional bacteria  - s/p 1 lit NS in ED  - continue with LR @75cc/hr  - po bicarb 650mg tid for now  - repeat bmp and lactate in evening s/p fluids and lokelma  - continue with cyclosporin 50mg in am, 100mg bedtime and prednisone 5mg qd  - nephro consult    #Htn  - elevated on admission 170/100,   - no headache/chest pain/vision changes/sob  - resume lisinopril 5mg qd    #Gout   - no recent flares  - resume allopurinol 300mg qd      DVT - Lovenox sq  Diet - dash low potassium diet  GI prophylaxis - takes Prilosec at home  Activity - AAT    Verified medrec with patient and mother at bedside, patients pharmacy [facility] [address] and [facility] [address]    Pending - duplex/dimer, Repeat bmp, lactate, nephro consult, syncope workup, BP control 54 y/o F with PMH HTN, hx kidney transplant on cyclosporine and prednisone, Gout, Htn, hx b/l hip replacement and left knee replacement BIBEMS after a fall at home. Admitted for syncope workup and Acidosis/hyperkalemia on labs.     #Syncope cardiac vs vasovagal   - s/p fall  - unresponsive for ~20mins as per patients mother and confused at scene when EMS arrived  - no reported seizure activity  - no complaints of palpitations/chest pain/sob/dizziness/blurred vision/focal weakness(apart from pain limiting motion now)  - tachy on admission ekg sinus tach, t wave inversion lead 3, and r heart strain     Plan  - monitor under tele  - orthostatics  - TTE  - Follow up dimer, duplex LE (tachy on admission with right heart strain, trops -ve x 1, no sob, no unilateral LE swelling, Recent travel to [facility] r/o PE)--->get VQ scan if dimer elevated  - fall precautions    #?DARCI with metabolic acidosis(Ph 7.25, bicarb 13), hyperkalemia(5.6), and lactic acidosis(4.3)  - s/p renal transplant in 20s   - as per pt baseline Cr ~2.5 since last year when she got admitted for gout attack at an outside hospital  - No nausea/vomiting/abd pain/dysuria/hematuria  - UA +ve protenuria 300mg/dl, ketones +ve, large blood, rbc 5/hpf, occasional bacteria  - s/p 1 lit NS in ED  - continue with LR @75cc/hr  - po bicarb 650mg tid for now  - repeat bmp and lactate in evening s/p fluids and lokelma  - continue with cyclosporin 50mg in am, 100mg bedtime and prednisone 5mg qd  - nephro consult    #Htn  - elevated on admission 170/100,   - no headache/chest pain/vision changes/sob  - resume lisinopril 5mg qd    #Gout   - no recent flares  - resume allopurinol 300mg qd      DVT - Lovenox sq  Diet - dash low potassium diet  GI prophylaxis - takes Prilosec at home  Activity - AAT    Verified medrec with patient and mother at bedside, patients pharmacy [facility] [address] and [facility] [address]    Pending - duplex/dimer, Repeat bmp, lactate, nephro consult, syncope workup, BP control 54 y/o F with PMH HTN, hx kidney transplant on cyclosporine and prednisone, Gout, Htn, hx b/l hip replacement and left knee replacement BIBEMS after a fall at home. Admitted for syncope workup and Acidosis/hyperkalemia on labs.     #Syncope cardiac vs vasovagal   - s/p fall  - unresponsive for ~20mins as per patients mother and confused at scene when EMS arrived  - no reported seizure activity  - no complaints of palpitations/chest pain/sob/dizziness/blurred vision/focal weakness(apart from pain limiting motion now)  - tachy on admission ekg sinus tach, t wave inversion lead 3, and r heart strain     Plan  - monitor under tele  - orthostatics  - TTE  - Follow up dimer, duplex LE (tachy on admission with right heart strain, trops -ve x 1, no sob, no unilateral LE swelling, Recent travel to [facility] r/o PE)--->get VQ scan if dimer elevated  - fall precautions    #?DARCI with metabolic acidosis(Ph 7.25, bicarb 13), hyperkalemia(5.6), and lactic acidosis(4.3)  - s/p renal transplant in 20s   - as per pt baseline Cr ~2.5 since last year when she got admitted for gout attack at an outside hospital  - No nausea/vomiting/abd pain/dysuria/hematuria  - UA +ve protenuria 300mg/dl, ketones +ve, large blood, rbc 5/hpf, occasional bacteria  - s/p 1 lit NS in ED  - continue with LR @75cc/hr  - po bicarb 650mg tid for now  - repeat bmp and lactate in evening s/p fluids and lokelma  - continue with cyclosporin 50mg in am, 100mg bedtime and prednisone 5mg qd  - hold lisinopril  - nephro consult    #Htn  - elevated on admission 170/100,   - no headache/chest pain/vision changes/sob  - holding lisinopril, will start on nifedipine 60mg xl, change if tachy persists     #Gout   - no recent flares  - resume allopurinol 300mg qd      DVT - Lovenox sq  Diet - dash low potassium diet  GI prophylaxis - takes Prilosec at home  Activity - AAT    Verified medrec with patient and mother at bedside, patients pharmacy [facility] [address] and [facility] [address]    Pending - duplex/dimer, Repeat bmp, lactate, nephro consult, syncope workup, BP control 52 y/o F with PMH HTN, hx kidney transplant on cyclosporine and prednisone, Gout, Htn, hx b/l hip replacement and left knee replacement BIBEMS after a fall at home. Admitted for syncope workup and Acidosis/hyperkalemia on labs.     #Syncope cardiac vs vasovagal   - s/p fall  - unresponsive for ~20mins as per patients mother and confused at scene when EMS arrived  - no reported seizure activity  - no complaints of palpitations/chest pain/sob/dizziness/blurred vision/focal weakness(apart from pain limiting motion now)  - tachy on admission ekg sinus tach, t wave inversion lead 3, and r heart strain   - trauma workup done in ed, left shoulder with widening of the acromioclavicular joint --.s/p sling in ed    Plan  - monitor under tele  - orthostatics  - TTE  - Follow up dimer, duplex LE (tachy on admission with right heart strain, trops -ve x 1, no sob, no unilateral LE swelling, Recent travel to [facility] r/o PE)--->get VQ scan if dimer elevated  - fall precautions    #?DARCI with metabolic acidosis(Ph 7.25, bicarb 13), hyperkalemia(5.6), and lactic acidosis(4.3)  - s/p renal transplant in 20s   - as per pt baseline Cr ~2.5 since last year when she got admitted for gout attack at an outside hospital  - No nausea/vomiting/abd pain/dysuria/hematuria  - UA +ve protenuria 300mg/dl, ketones +ve, large blood, rbc 5/hpf, occasional bacteria  - s/p 1 lit NS in ED  - continue with LR @75cc/hr  - po bicarb 650mg tid for now  - repeat bmp and lactate in evening s/p fluids and lokelma  - continue with cyclosporin 50mg in am, 100mg bedtime and prednisone 5mg qd  - hold lisinopril  - nephro consult    #Htn  - elevated on admission 170/100,   - no headache/chest pain/vision changes/sob  - holding lisinopril, will start on nifedipine 60mg xl, change if tachy persists     #Gout   - no recent flares  - resume allopurinol 300mg qd      DVT - heparin sq  Diet - dash low potassium diet  GI prophylaxis - takes Prilosec at home  Activity - AAT    Verified medrec with patient and mother at bedside, patients pharmacy [facility] [address] and [facility] [address]    Pending - duplex/dimer, Repeat bmp, lactate, nephro consult, syncope workup, BP control

## 2023-06-05 NOTE — H&P ADULT - ATTENDING COMMENTS
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  had recent travel to [facility] 1 week before.  Patient was walking to restroom and had syncope. [place]. heard the sound and came to help. Called EMS. patient was unconscious for 20-25 minutes.   No h/o seizures, no jerky movement, incontinence episode or tongue biting  No h/o cadiac problems; no prior history.  No chest pain or SOB on awakening or prior to even.   No prodromal symptoms reported.  no calf area pain or tenderness    PHYSICAL EXAM    GEN: no distress, comfortable  PULM: BS heard b/l equal, No wheezing  CVS: S1S2 present, no rubs or gallops  ABD: Soft, non-distended, no guarding; non-tender  EXT: No lower extremity edema  NEURO: A&Ox3, moving all extremities    # syncope- orthostatic vs cardiogenic  EKG- sinus, extreme right axis deviation, TWI in lead 3;   Trop- less than 0.01  patient denies any prior cardiopulmonary history. No pulmonary symptoms now, no pleuritic pain , sat 98% on room air, no tachycardia- unlikely PE  repeat EKG  - check echo; for monitoring any signs of right heart strain, pulm HT  - check US venous  b/l LE  CK- 369  monitor in tele  orthostatic vitals    # DARCI  # HAGMA- possibly CKD and lactic acidosis  - h/o ESRD s/p renal transplant in '93  baseline creatinine 2.1  give 1 L NS bolus  continue maintenance fluids  repeat BMP showing improvement in acidosis  continue cyclosporine 50 in morning and 100 at night, prednisone 5 mg daily  monitor renal function  - hold lisinopril    # GERD  # HTN    DVT px- heparin    plan of care d/w patient and also with mother at bedside  Goals of care discussed- patient is full code.

## 2023-06-05 NOTE — H&P ADULT - NSHPPHYSICALEXAM_GEN_ALL_CORE
Gen: NAD, non-toxic, conversational  Eyes: PERRLA, EOMI   HENT: Normocephalic, atraumatic. External ears normal, no rhinorrhea, moist mucous membranes.   CV: normal s1/s2  Resp: bilateral clear  Abd: soft, non tender  Back: No CVAT bilaterally, no midline ttp  Skin: small bruises on left side of face  MSK: Tenderness to palpation of left shoulder tip and ankle, no swelling, no LE edema  Neuro: AOx3, speech is fluent and appropriate, no focal deficits

## 2023-06-05 NOTE — CHART NOTE - NSCHARTNOTEFT_GEN_A_CORE
patient seen, examined and H&P done.   H&P document to be completed.    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  had recent travel to [facility] 1 week before.  Patient was walking to restroom and had syncope. [place]. heard the sound and came to help. Called EMS. patient was unconscious for 20-25 minutes.   No h/o seizures, no jerky movement, incontinence episode or tongue biting  No h/o cadiac problems; no prior history.  No chest pain or SOB on awakening or prior to even.   No prodromal symptoms reported.  no calf area pain or tenderness    PHYSICAL EXAM    GEN: no distress, comfortable  PULM: BS heard b/l equal, No wheezing  CVS: S1S2 present, no rubs or gallops  ABD: Soft, non-distended, no guarding; non-tender  EXT: No lower extremity edema  NEURO: A&Ox3, moving all extremities    # syncope- orthostatic vs cardiogenic  EKG- sinus, extreme right axis deviation, TWI in lead 3;   Trop- less than 0.01  patient denies any prior cardiopulmonary history. No pulmonary symptoms now, no pleuritic pain , sat 98% on room air, no tachycardia- unlikely PE  repeat EKG  - check echo; for monitoring any signs of right heart strain, pulm HT  - check US venous  b/l LE  CK- 369  monitor in tele  orthostatic vitals    # DARCI  # HAGMA- possibly CKD and lactic acidosis  - h/o ESRD s/p renal transplant in '93  baseline creatinine 2.1  give 1 L NS bolus  continue maintenance fluids  repeat BMP showing improvement in acidosis  continue cyclosporine 50 in morning and 100 at night, prednisone 5 mg daily  monitor renal function    # GERD  # HTN    DVT px- heparin    plan of care d/w patient and also with mother at bedside  Goals of care discussed- patient is full code patient seen, examined and H&P done.   H&P document to be completed.    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  had recent travel to [facility] 1 week before.  Patient was walking to restroom and had syncope. [place]. heard the sound and came to help. Called EMS. patient was unconscious for 20-25 minutes.   No h/o seizures, no jerky movement, incontinence episode or tongue biting  No h/o cadiac problems; no prior history.  No chest pain or SOB on awakening or prior to even.   No prodromal symptoms reported.  no calf area pain or tenderness    PHYSICAL EXAM    GEN: no distress, comfortable  PULM: BS heard b/l equal, No wheezing  CVS: S1S2 present, no rubs or gallops  ABD: Soft, non-distended, no guarding; non-tender  EXT: No lower extremity edema  NEURO: A&Ox3, moving all extremities    # syncope- orthostatic vs cardiogenic  EKG- sinus, extreme right axis deviation, TWI in lead 3;   Trop- less than 0.01  patient denies any prior cardiopulmonary history. No pulmonary symptoms now, no pleuritic pain , sat 98% on room air, no tachycardia- unlikely PE  repeat EKG  - check echo; for monitoring any signs of right heart strain, pulm HT  - check US venous  b/l LE  CK- 369  monitor in tele  orthostatic vitals    # DARCI  # HAGMA- possibly CKD and lactic acidosis  - h/o ESRD s/p renal transplant in '93  baseline creatinine 2.1  give 1 L NS bolus  continue maintenance fluids  repeat BMP showing improvement in acidosis  continue cyclosporine 50 in morning and 100 at night, prednisone 5 mg daily  monitor renal function  - hold lisinopril    # GERD  # HTN    DVT px- heparin    plan of care d/w patient and also with mother at bedside  Goals of care discussed- patient is full code

## 2023-06-05 NOTE — ED ADULT NURSE NOTE - NSFALLRISKINTERV_ED_ALL_ED
Assistance OOB with selected safe patient handling equipment if applicable/Communicate fall risk and risk factors to all staff, patient, and family/Orthostatic vital signs/Provide visual cue: yellow wristband, yellow gown, etc/Reinforce activity limits and safety measures with patient and family/Call bell, personal items and telephone in reach/Instruct patient to call for assistance before getting out of bed/chair/stretcher/Non-slip footwear applied when patient is off stretcher/[place] to call system/Physically safe environment - no spills, clutter or unnecessary equipment/Purposeful Proactive Rounding/Room/bathroom lighting operational, light cord in reach

## 2023-06-05 NOTE — ED ADULT NURSE NOTE - NSICDXPASTSURGICALHX_GEN_ALL_CORE_FT
PAST SURGICAL HISTORY:  H/O kidney transplant     H/O total knee replacement, right     History of total left knee replacement

## 2023-06-05 NOTE — ED ADULT TRIAGE NOTE - CHIEF COMPLAINT QUOTE
pt biba s/p syncope and fall at home. pt was found on the floor by her mom . pt biba s/p syncope and fall at home. pt was found on the floor by her mom .  mg/dl by EMS

## 2023-06-05 NOTE — H&P ADULT - TIME BILLING
Total time spent to complete patient's bedside assessment, review medical chart, discuss medical plan of care with  medical team was more than 75 minutes  with >50% of time spent face to face with patient, discussion with patient/family and/or coordination of care.

## 2023-06-06 LAB
ALBUMIN SERPL ELPH-MCNC: 3.3 G/DL — LOW (ref 3.5–5.2)
ALP SERPL-CCNC: 81 U/L — SIGNIFICANT CHANGE UP (ref 30–115)
ALT FLD-CCNC: 13 U/L — SIGNIFICANT CHANGE UP (ref 0–41)
ANION GAP SERPL CALC-SCNC: 12 MMOL/L — SIGNIFICANT CHANGE UP (ref 7–14)
APTT BLD: 157.3 SEC — CRITICAL HIGH (ref 27–39.2)
APTT BLD: 160.4 SEC — CRITICAL HIGH (ref 27–39.2)
AST SERPL-CCNC: 26 U/L — SIGNIFICANT CHANGE UP (ref 0–41)
BASOPHILS # BLD AUTO: 0.03 K/UL — SIGNIFICANT CHANGE UP (ref 0–0.2)
BASOPHILS NFR BLD AUTO: 0.3 % — SIGNIFICANT CHANGE UP (ref 0–1)
BILIRUB SERPL-MCNC: 0.3 MG/DL — SIGNIFICANT CHANGE UP (ref 0.2–1.2)
BUN SERPL-MCNC: 30 MG/DL — HIGH (ref 10–20)
CALCIUM SERPL-MCNC: 8.9 MG/DL — SIGNIFICANT CHANGE UP (ref 8.4–10.4)
CHLORIDE SERPL-SCNC: 103 MMOL/L — SIGNIFICANT CHANGE UP (ref 98–110)
CK SERPL-CCNC: 214 U/L — SIGNIFICANT CHANGE UP (ref 0–225)
CO2 SERPL-SCNC: 21 MMOL/L — SIGNIFICANT CHANGE UP (ref 17–32)
CREAT SERPL-MCNC: 2.2 MG/DL — HIGH (ref 0.7–1.5)
CULTURE RESULTS: SIGNIFICANT CHANGE UP
EGFR: 26 ML/MIN/1.73M2 — LOW
EOSINOPHIL # BLD AUTO: 0.12 K/UL — SIGNIFICANT CHANGE UP (ref 0–0.7)
EOSINOPHIL NFR BLD AUTO: 1.2 % — SIGNIFICANT CHANGE UP (ref 0–8)
GLUCOSE SERPL-MCNC: 128 MG/DL — HIGH (ref 70–99)
HCT VFR BLD CALC: 27.8 % — LOW (ref 37–47)
HCT VFR BLD CALC: 28.1 % — LOW (ref 37–47)
HGB BLD-MCNC: 8.8 G/DL — LOW (ref 12–16)
HGB BLD-MCNC: 9.1 G/DL — LOW (ref 12–16)
IMM GRANULOCYTES NFR BLD AUTO: 0.4 % — HIGH (ref 0.1–0.3)
LACTATE SERPL-SCNC: 0.9 MMOL/L — SIGNIFICANT CHANGE UP (ref 0.7–2)
LACTATE SERPL-SCNC: 1.1 MMOL/L — SIGNIFICANT CHANGE UP (ref 0.7–2)
LYMPHOCYTES # BLD AUTO: 1.9 K/UL — SIGNIFICANT CHANGE UP (ref 1.2–3.4)
LYMPHOCYTES # BLD AUTO: 19.3 % — LOW (ref 20.5–51.1)
MCHC RBC-ENTMCNC: 31.7 G/DL — LOW (ref 32–37)
MCHC RBC-ENTMCNC: 32.4 G/DL — SIGNIFICANT CHANGE UP (ref 32–37)
MCHC RBC-ENTMCNC: 33.1 PG — HIGH (ref 27–31)
MCHC RBC-ENTMCNC: 33.7 PG — HIGH (ref 27–31)
MCV RBC AUTO: 104.1 FL — HIGH (ref 81–99)
MCV RBC AUTO: 104.5 FL — HIGH (ref 81–99)
MONOCYTES # BLD AUTO: 0.65 K/UL — HIGH (ref 0.1–0.6)
MONOCYTES NFR BLD AUTO: 6.6 % — SIGNIFICANT CHANGE UP (ref 1.7–9.3)
NEUTROPHILS # BLD AUTO: 7.13 K/UL — HIGH (ref 1.4–6.5)
NEUTROPHILS NFR BLD AUTO: 72.2 % — SIGNIFICANT CHANGE UP (ref 42.2–75.2)
NRBC # BLD: 0 /100 WBCS — SIGNIFICANT CHANGE UP (ref 0–0)
NRBC # BLD: 0 /100 WBCS — SIGNIFICANT CHANGE UP (ref 0–0)
PLATELET # BLD AUTO: 209 K/UL — SIGNIFICANT CHANGE UP (ref 130–400)
PLATELET # BLD AUTO: 211 K/UL — SIGNIFICANT CHANGE UP (ref 130–400)
PMV BLD: 10.3 FL — SIGNIFICANT CHANGE UP (ref 7.4–10.4)
PMV BLD: 10.6 FL — HIGH (ref 7.4–10.4)
POTASSIUM SERPL-MCNC: 4.6 MMOL/L — SIGNIFICANT CHANGE UP (ref 3.5–5)
POTASSIUM SERPL-SCNC: 4.6 MMOL/L — SIGNIFICANT CHANGE UP (ref 3.5–5)
PROT SERPL-MCNC: 6 G/DL — SIGNIFICANT CHANGE UP (ref 6–8)
RBC # BLD: 2.66 M/UL — LOW (ref 4.2–5.4)
RBC # BLD: 2.7 M/UL — LOW (ref 4.2–5.4)
RBC # FLD: 15.2 % — HIGH (ref 11.5–14.5)
RBC # FLD: 15.2 % — HIGH (ref 11.5–14.5)
SODIUM SERPL-SCNC: 136 MMOL/L — SIGNIFICANT CHANGE UP (ref 135–146)
SPECIMEN SOURCE: SIGNIFICANT CHANGE UP
TROPONIN T SERPL-MCNC: <0.01 NG/ML — SIGNIFICANT CHANGE UP
WBC # BLD: 8.39 K/UL — SIGNIFICANT CHANGE UP (ref 4.8–10.8)
WBC # BLD: 9.87 K/UL — SIGNIFICANT CHANGE UP (ref 4.8–10.8)
WBC # FLD AUTO: 8.39 K/UL — SIGNIFICANT CHANGE UP (ref 4.8–10.8)
WBC # FLD AUTO: 9.87 K/UL — SIGNIFICANT CHANGE UP (ref 4.8–10.8)

## 2023-06-06 PROCEDURE — 93306 TTE W/DOPPLER COMPLETE: CPT | Mod: 26

## 2023-06-06 PROCEDURE — 99232 SBSQ HOSP IP/OBS MODERATE 35: CPT

## 2023-06-06 RX ORDER — APIXABAN 2.5 MG/1
10 TABLET, FILM COATED ORAL
Refills: 0 | Status: DISCONTINUED | OUTPATIENT
Start: 2023-06-06 | End: 2023-06-07

## 2023-06-06 RX ADMIN — Medication 650 MILLIGRAM(S): at 23:32

## 2023-06-06 RX ADMIN — HEPARIN SODIUM 0 UNIT(S)/HR: 5000 INJECTION INTRAVENOUS; SUBCUTANEOUS at 14:43

## 2023-06-06 RX ADMIN — ATORVASTATIN CALCIUM 10 MILLIGRAM(S): 80 TABLET, FILM COATED ORAL at 22:34

## 2023-06-06 RX ADMIN — Medication 650 MILLIGRAM(S): at 06:08

## 2023-06-06 RX ADMIN — Medication 650 MILLIGRAM(S): at 14:16

## 2023-06-06 RX ADMIN — PANTOPRAZOLE SODIUM 40 MILLIGRAM(S): 20 TABLET, DELAYED RELEASE ORAL at 06:08

## 2023-06-06 RX ADMIN — APIXABAN 10 MILLIGRAM(S): 2.5 TABLET, FILM COATED ORAL at 22:33

## 2023-06-06 RX ADMIN — HEPARIN SODIUM 1200 UNIT(S)/HR: 5000 INJECTION INTRAVENOUS; SUBCUTANEOUS at 15:56

## 2023-06-06 RX ADMIN — Medication 5 MILLIGRAM(S): at 06:08

## 2023-06-06 RX ADMIN — HEPARIN SODIUM 1500 UNIT(S)/HR: 5000 INJECTION INTRAVENOUS; SUBCUTANEOUS at 07:07

## 2023-06-06 RX ADMIN — HEPARIN SODIUM 0 UNIT(S)/HR: 5000 INJECTION INTRAVENOUS; SUBCUTANEOUS at 06:06

## 2023-06-06 RX ADMIN — Medication 300 MILLIGRAM(S): at 06:19

## 2023-06-06 NOTE — PROGRESS NOTE ADULT - ASSESSMENT
54 y/o F with PMH HTN, hx kidney transplant on cyclosporine and prednisone, Gout, Htn, hx b/l hip replacement and left knee replacement BIBEMS after a fall at home. Admitted for syncope workup and Acidosis/hyperkalemia on labs.     #Syncope cardiac vs vasovagal   #Acute PE with +DVT (possible unprovoked give pelvic mass hx)  - s/p fall  - unresponsive for ~20mins as per patients mother and confused at scene when EMS arrived  - no complaints of palpitations/chest pain/sob/dizziness/blurred vision/focal weakness(apart from pain limiting motion now)  - tachy on admission ekg sinus tach, t wave inversion lead 3, and r heart strain   - trauma workup done in ed, left shoulder with widening of the acromioclavicular joint --.s/p sling in ed  - PE + on VQ scan ad +DVT in gastrocnemius   - monitor under tele  - orthostatics  - transthoracic echocardiography no RHS on echo, ef wnl   - check EEG   - heme/onc outpt and pulm outpt  - pt was on heparin ggt, due to shoulder separaction, ortho consulted, no intervention, started on eliquis  -Discussed benefits and risks of starting anticoagulation including risk of excessively bleeding gums or nose bleeds, hematuria,  hemorrhagic stroke, GI bleed,  excessive bleeding after trauma or cuts and even death. Advised seek medical intervention immediately. Pt decided to start anticoagulation given benefits outweighs risk.      #DARCI with metabolic acidosis(Ph 7.25, bicarb 13), hyperkalemia(5.6), and lactic acidosis(4.3)  - s/p renal transplant in 20s   - as per pt baseline Cr ~2.5 since last year when she got admitted for gout attack at an outside hospital  - No nausea/vomiting/abd pain/dysuria/hematuria  - UA +ve protenuria 300mg/dl, ketones +ve, large blood, rbc 5/hpf, occasional bacteria  - s/p 1 lit NS in ED  - continue with LR @75cc/hr  - po bicarb 650mg tid for now  - repeat bmp and lactate in evening s/p fluids and lokelma  - continue with cyclosporin 50mg in am, 100mg bedtime and prednisone 5mg qd  - hold lisinopril  - nephro consult  - follow up cmp today    #Pelvic mass   Partially calcified mass is noted in the right pelvis   measuring up to 5.7 cm. Surgical clips are noted as well.  - has had work up outpt     #left shoulder separation questionable  - follow up ortho  - PT     #Htn  - elevated on admission 170/100,   - no headache/chest pain/vision changes/sob  - holding lisinopril, will start on nifedipine 60mg xl, change if tachy persists   - imrpoved     #anemia ---- hgb on admin---    #Gout   - no recent flares  - resume allopurinol 300mg qd   52 y/o F with PMH HTN, hx kidney transplant on cyclosporine and prednisone, Gout, Htn, hx b/l hip replacement and left knee replacement BIBEMS after a fall at home. Admitted for syncope workup and Acidosis/hyperkalemia on labs.     #Syncope cardiac vs vasovagal   #Acute PE with +DVT (possible unprovoked give pelvic mass hx)  - s/p fall  - unresponsive for ~20mins as per patients mother and confused at scene when EMS arrived  - no complaints of palpitations/chest pain/sob/dizziness/blurred vision/focal weakness(apart from pain limiting motion now)  - tachy on admission ekg sinus tach, t wave inversion lead 3, and r heart strain   - trauma workup done in ed, left shoulder with widening of the acromioclavicular joint --.s/p sling in ed  - PE + on VQ scan ad +DVT in gastrocnemius   - monitor under tele  - orthostatics  - transthoracic echocardiography no RHS on echo, ef wnl   - check EEG   - heme/onc outpt and pulm outpt  - pt was on heparin ggt, due to shoulder separaction, ortho consulted, no intervention, started on eliquis  -Discussed benefits and risks of starting anticoagulation including risk of excessively bleeding gums or nose bleeds, hematuria,  hemorrhagic stroke, GI bleed,  excessive bleeding after trauma or cuts and even death. Advised seek medical intervention immediately. Pt decided to start anticoagulation given benefits outweighs risk.      #DARCI with metabolic acidosis(Ph 7.25, bicarb 13), hyperkalemia(5.6), and lactic acidosis(4.3)  - s/p renal transplant in 20s   - as per pt baseline Cr ~2.5 since last year when she got admitted for gout attack at an outside hospital  - No nausea/vomiting/abd pain/dysuria/hematuria  - UA +ve protenuria 300mg/dl, ketones +ve, large blood, rbc 5/hpf, occasional bacteria  - s/p 1 lit NS in ED  - continue with LR @75cc/hr  - po bicarb 650mg tid for now  - repeat bmp and lactate in evening s/p fluids and lokelma  - continue with cyclosporin 50mg in am, 100mg bedtime and prednisone 5mg qd  - hold lisinopril  - nephro consult  - follow up cmp today    #Pelvic mass   Partially calcified mass is noted in the right pelvis   measuring up to 5.7 cm. Surgical clips are noted as well.  - has had work up outpt     #left shoulder separation questionable  - follow up ortho  - PT     #Htn  - elevated on admission 170/100,   - no headache/chest pain/vision changes/sob  - holding lisinopril, will start on nifedipine 60mg xl, change if tachy persists   - imrpoved     #anemia ---- hgb on admission 11-->9s  - dw pt as out pt she is anenmic with hgb ion 9s, also possible hemodilution   - will repeat   - no obvious signs of bleeding   - trend   -     #Gout   - no recent flares  - continue allopurinol 300mg qd,    #Progress Note Handoff  Pending (specify):  follow up above, eeg, hgb, cardiac telemonitoring   Family discussion: extensive discussion with pt at bedside  Disposition: home  Decision to admit the pt is based on acuity as above

## 2023-06-06 NOTE — CHART NOTE - NSCHARTNOTEFT_GEN_A_CORE
On review of trauma workup imaging xray pelvis noted to have: Partially calcified mass is noted in the right pelvis measuring up to 5.7 cm. Surgical clips are noted as well.    Discussed with patient, she believes it has been there for years and believes she has had a biopsy of it in the past, the patient stated she will try to get her records.

## 2023-06-06 NOTE — PHYSICAL THERAPY INITIAL EVALUATION ADULT - PERTINENT HX OF CURRENT PROBLEM, REHAB EVAL
54 y/o F with PMH HTN, hx kidney transplant on cyclosporine and prednisone, Gout, Htn, hx b/l hip replacement and left knee replacement BIBEMS after a fall at home. Admitted for syncope workup and Acidosis/hyperkalemia on labs.   #Syncope cardiac vs vasovagal   - s/p fall  - unresponsive for ~20mins as per patients mother and confused at scene when EMS arrived  - left shoulder with widening of the acromioclavicular joint --.s/p sling in ed

## 2023-06-06 NOTE — PHYSICAL THERAPY INITIAL EVALUATION ADULT - GENERAL OBSERVATIONS, REHAB EVAL
Pt seen from [id number]. Pt encountered in the bed, +tele, agreeable for b/s PT, Mother at b/s. Pt is independent/supervision with functional mobility at this time no further PT interventions needed at this time. Please reconsult PT in future if any changes in functional mobility. ALFRED Messina made aware.

## 2023-06-06 NOTE — CHART NOTE - NSCHARTNOTEFT_GEN_A_CORE
Discussed AC joint separation with Ortho team.     - OP F/u w/ Dr. Marv Guajardo MD  - No acute intervention, can start Eliquis

## 2023-06-06 NOTE — PROGRESS NOTE ADULT - SUBJECTIVE AND OBJECTIVE BOX
Patient is a 53y old  Female who presents with a chief complaint of     Pt seen and examined at bedside. No CP or SOB.      PAST MEDICAL & SURGICAL HISTORY:  Hypertension    H/O kidney transplant    History of total left knee replacement    H/O total knee replacement, right        VITAL SIGNS (Last 24 hrs):  T(C): 36.9 (06-06-23 @ 16:55), Max: 37.1 (06-05-23 @ 23:58)  HR: 85 (06-06-23 @ 16:55) (74 - 85)  BP: 132/84 (06-06-23 @ 16:55) (132/84 - 157/89)  RR: 18 (06-06-23 @ 16:55) (17 - 18)  SpO2: 96% (06-06-23 @ 16:55) (96% - 98%)  Wt(kg): --  Daily     Daily     I&O's Summary      PHYSICAL EXAM:  GENERAL: NAD, well-developed  HEAD:  Atraumatic, Normocephalic  EYES: EOMI, PERRLA, conjunctiva and sclera clear  NECK: Supple, No JVD  CHEST/LUNG: Clear to auscultation bilaterally; No wheeze  HEART: Regular rate and rhythm; No murmurs, rubs, or gallops  ABDOMEN: Soft, Nontender, Nondistended; Bowel sounds present  EXTREMITIES:  2+ Peripheral Pulses, No clubbing, cyanosis, or edema, left arm sling   PSYCH: AAOx3,   NEUROLOGY: non-focal  SKIN: No rashes or lesions    Labs Reviewed  Spoke to patient in regards to abnormal labs.    CBC Full  -  ( 06 Jun 2023 04:35 )  WBC Count : 8.39 K/uL  Hemoglobin : 9.1 g/dL  Hematocrit : 28.1 %  Platelet Count - Automated : 211 K/uL  Mean Cell Volume : 104.1 fL  Mean Cell Hemoglobin : 33.7 pg  Mean Cell Hemoglobin Concentration : 32.4 g/dL  Auto Neutrophil # : x  Auto Lymphocyte # : x  Auto Monocyte # : x  Auto Eosinophil # : x  Auto Basophil # : x  Auto Neutrophil % : x  Auto Lymphocyte % : x  Auto Monocyte % : x  Auto Eosinophil % : x  Auto Basophil % : x    BMP:    06-05 @ 21:22    Blood Urea Nitrogen - 35  Calcium - 9.1  Carbond Dioxide - 16  Chloride - 103  Creatinine - 2.3  Glucose - 101  Potassium - 4.3  Sodium - 135      Hemoglobin A1c -   PT/INR - ( 05 Jun 2023 19:06 )   PT: 11.00 sec;   INR: 0.97 ratio         PTT - ( 06 Jun 2023 12:50 )  PTT:160.4 sec  Urine Culture:  06-05 @ 07:45 Urine culture: --    Culture Results:   <10,000 CFU/mL Normal Urogenital Klaudia  Method Type: --  Organism: --  Organism Identification: --  Specimen Source: Clean Catch Clean Catch (Midstream)  06-05 @ 06:30 Urine culture: --    Culture Results:   No growth to date.  Method Type: --  Organism: --  Organism Identification: --  Specimen Source: .Blood Blood-Peripheral    COVID Labs  CRP:      D-Dimer:  857 ng/mL DDU (06-05-23 @ 16:00)    Imaging reviewed independently and reviewed read  < from: NM Pulmonary Ventilation/Perfusion Scan (06.05.23 @ 22:32) >    IMPRESSION:  HIGH PROBABILITY FOR PULMONARY EMBOLISM.  SEGMENTAL IN CHARACTER PERFUSION DEFECTS IN THE RIGHT POSTERIOR BASAL AND   ANTERIOR BASAL SEGMENTS NOT MATCHED BY VENTILATION SCAN ABNORMALITIES.    These findings were discussed with Dr. Soler at 6/6/2023 10:09 AM by Dr. Webb with read backconfirmation.    < end of copied text >        < from: [facility] Duplex Lower Ext Vein Scan, Bilat (06.05.23 @ 18:26) >  Impression:    DVT noted in the left gastrocnemius vein  Compressible echogenic band noted in the right popliteal vein    < end of copied text >    < from: Xray Humerus, Left (06.05.23 @ 08:00) >  Left knee  The patient is post total left knee arthroplasty with hardware appearing   in good position. No evidence of knee joint effusion. Multiple ossified   structures are noted posterior to the knee joint compatible with   osteochondromatosis    < from: Xray Shoulder 2 Views, Left (06.05.23 @ 07:54) >  Findings/  impression:    Pelvis  No acute fracture or acute articular abnormality. The patient is post   bilateral hip arthroplasty with partially included hardware appearing in   good position. Partially calcified mass is noted in the right pelvis   measuring up to 5.7 cm. Surgical clips are noted as well.    Left shoulder  There is widening of the acromioclavicular joint raising a question of   separation. The glenohumeral joint appears maintained. No acute fracture   is seen. Telemetry leads    Left humerus  No acute fracture or acute articular abnormality. Several leads are   projecting over the distal humerus.    Left ankle  No acute fracture or acute articular abnormality. Bony productive changes   of the ankle joint. Otherwise, the ankle mortise is maintained. Midfoot   degenerative changes.    Left knee  The patient is post total left knee arthroplasty with hardware appearing   in good position. No evidence of knee joint effusion. Multiple ossified   structures are noted posterior to the knee joint compatible with   osteochondromatosis    < end of copied text >  < end of copied text >          < from: TTE Echo Complete w/ Contrast w/ Doppler (06.06.23 @ 09:27) >  Summary:   1. Left ventricular ejection fraction, by visual estimation, is 65 to   70%.   2. Normal global left ventricular systolic function.   3. Endocardial visualization was enhanced with intravenous echo contrast.    < end of copied text >    MEDICATIONS  (STANDING):  allopurinol 300 milliGRAM(s) Oral daily  apixaban 10 milliGRAM(s) Oral two times a day  atorvastatin 10 milliGRAM(s) Oral at bedtime  cycloSPORINE  (SandIMMUNE)  Solution 50 milliGRAM(s) Oral daily  cycloSPORINE  (SandIMMUNE)  Solution 100 milliGRAM(s) Oral at bedtime  lactated ringers. 1000 milliLiter(s) (75 mL/Hr) IV Continuous <Continuous>  pantoprazole    Tablet 40 milliGRAM(s) Oral before breakfast  predniSONE   Tablet 5 milliGRAM(s) Oral daily  sodium bicarbonate 650 milliGRAM(s) Oral three times a day    MEDICATIONS  (PRN):  oxycodone    5 mG/acetaminophen 325 mG 1 Tablet(s) Oral every 6 hours PRN Moderate Pain (4 - 6)

## 2023-06-07 ENCOUNTER — TRANSCRIPTION ENCOUNTER (OUTPATIENT)
Age: 53
End: 2023-06-07

## 2023-06-07 VITALS
TEMPERATURE: 97 F | DIASTOLIC BLOOD PRESSURE: 88 MMHG | SYSTOLIC BLOOD PRESSURE: 143 MMHG | HEART RATE: 80 BPM | RESPIRATION RATE: 18 BRPM

## 2023-06-07 LAB
ANION GAP SERPL CALC-SCNC: 14 MMOL/L — SIGNIFICANT CHANGE UP (ref 7–14)
APTT BLD: 28.2 SEC — SIGNIFICANT CHANGE UP (ref 27–39.2)
BLD GP AB SCN SERPL QL: SIGNIFICANT CHANGE UP
BUN SERPL-MCNC: 28 MG/DL — HIGH (ref 10–20)
CALCIUM SERPL-MCNC: 8.7 MG/DL — SIGNIFICANT CHANGE UP (ref 8.4–10.5)
CHLORIDE SERPL-SCNC: 100 MMOL/L — SIGNIFICANT CHANGE UP (ref 98–110)
CO2 SERPL-SCNC: 21 MMOL/L — SIGNIFICANT CHANGE UP (ref 17–32)
CREAT SERPL-MCNC: 2.1 MG/DL — HIGH (ref 0.7–1.5)
EGFR: 28 ML/MIN/1.73M2 — LOW
GLUCOSE BLDC GLUCOMTR-MCNC: 96 MG/DL — SIGNIFICANT CHANGE UP (ref 70–99)
GLUCOSE SERPL-MCNC: 84 MG/DL — SIGNIFICANT CHANGE UP (ref 70–99)
HCT VFR BLD CALC: 27.1 % — LOW (ref 37–47)
HGB BLD-MCNC: 8.7 G/DL — LOW (ref 12–16)
MAGNESIUM SERPL-MCNC: 1.7 MG/DL — LOW (ref 1.8–2.4)
MCHC RBC-ENTMCNC: 32.1 G/DL — SIGNIFICANT CHANGE UP (ref 32–37)
MCHC RBC-ENTMCNC: 33.7 PG — HIGH (ref 27–31)
MCV RBC AUTO: 105 FL — HIGH (ref 81–99)
NRBC # BLD: 0 /100 WBCS — SIGNIFICANT CHANGE UP (ref 0–0)
PLATELET # BLD AUTO: 205 K/UL — SIGNIFICANT CHANGE UP (ref 130–400)
PMV BLD: 10.9 FL — HIGH (ref 7.4–10.4)
POTASSIUM SERPL-MCNC: 3.9 MMOL/L — SIGNIFICANT CHANGE UP (ref 3.5–5)
POTASSIUM SERPL-SCNC: 3.9 MMOL/L — SIGNIFICANT CHANGE UP (ref 3.5–5)
RBC # BLD: 2.58 M/UL — LOW (ref 4.2–5.4)
RBC # FLD: 15.2 % — HIGH (ref 11.5–14.5)
SODIUM SERPL-SCNC: 135 MMOL/L — SIGNIFICANT CHANGE UP (ref 135–146)
WBC # BLD: 8.2 K/UL — SIGNIFICANT CHANGE UP (ref 4.8–10.8)
WBC # FLD AUTO: 8.2 K/UL — SIGNIFICANT CHANGE UP (ref 4.8–10.8)

## 2023-06-07 PROCEDURE — 99239 HOSP IP/OBS DSCHRG MGMT >30: CPT

## 2023-06-07 PROCEDURE — 95816 EEG AWAKE AND DROWSY: CPT | Mod: 26

## 2023-06-07 RX ORDER — SODIUM BICARBONATE 1 MEQ/ML
1 SYRINGE (ML) INTRAVENOUS
Qty: 0 | Refills: 0 | DISCHARGE
Start: 2023-06-07

## 2023-06-07 RX ORDER — MAGNESIUM SULFATE 500 MG/ML
2 VIAL (ML) INJECTION ONCE
Refills: 0 | Status: COMPLETED | OUTPATIENT
Start: 2023-06-07 | End: 2023-06-07

## 2023-06-07 RX ORDER — MAGNESIUM OXIDE 400 MG ORAL TABLET 241.3 MG
400 TABLET ORAL ONCE
Refills: 0 | Status: COMPLETED | OUTPATIENT
Start: 2023-06-07 | End: 2023-06-07

## 2023-06-07 RX ORDER — SODIUM BICARBONATE 1 MEQ/ML
1 SYRINGE (ML) INTRAVENOUS
Qty: 90 | Refills: 0
Start: 2023-06-07 | End: 2023-07-06

## 2023-06-07 RX ORDER — APIXABAN 2.5 MG/1
2 TABLET, FILM COATED ORAL
Qty: 64 | Refills: 0
Start: 2023-06-07 | End: 2023-07-06

## 2023-06-07 RX ADMIN — PANTOPRAZOLE SODIUM 40 MILLIGRAM(S): 20 TABLET, DELAYED RELEASE ORAL at 06:09

## 2023-06-07 RX ADMIN — Medication 25 GRAM(S): at 09:07

## 2023-06-07 RX ADMIN — Medication 650 MILLIGRAM(S): at 06:10

## 2023-06-07 RX ADMIN — Medication 650 MILLIGRAM(S): at 13:57

## 2023-06-07 RX ADMIN — Medication 300 MILLIGRAM(S): at 12:00

## 2023-06-07 RX ADMIN — Medication 5 MILLIGRAM(S): at 06:09

## 2023-06-07 RX ADMIN — MAGNESIUM OXIDE 400 MG ORAL TABLET 400 MILLIGRAM(S): 241.3 TABLET ORAL at 12:00

## 2023-06-07 RX ADMIN — APIXABAN 10 MILLIGRAM(S): 2.5 TABLET, FILM COATED ORAL at 06:09

## 2023-06-07 NOTE — DISCHARGE NOTE PROVIDER - CARE PROVIDER_API CALL
Marv Guajardo  Orthopaedic Surgery  [address] [place] [address]  Phone: [phone]  Fax: [phone]  Follow Up Time: 2 weeks    Tello [address], Admin - [address]  [place] [address]  Phone: [phone]  Fax: [phone]  Follow Up Time: 2 weeks

## 2023-06-07 NOTE — DISCHARGE NOTE PROVIDER - PROVIDER TOKENS
PROVIDER:[TOKEN:[[id number]],FOLLOWUP:[2 weeks]],PROVIDER:[TOKEN:[[id number]],FOLLOWUP:[2 weeks]]

## 2023-06-07 NOTE — OCCUPATIONAL THERAPY INITIAL EVALUATION ADULT - REHAB POTENTIAL, OT EVAL
Pt with no inpatient skilled OT needs. Discussed with medical resident Dr. Fine, danielle outpatient OT and physiatry consult to facilitate outpatient, orders placed by MD

## 2023-06-07 NOTE — CONSULT NOTE ADULT - SUBJECTIVE AND OBJECTIVE BOX
HPI:  52 y/o F with PMH HTN, hx kidney transplant on cyclosporine and prednisone, gout, htn, hx b/l hip replacement and left knee replacement BIBEMS after a fall at home. Patient was at baseline until this am when she woke up to walk to the bathroom and had a fall. Patients mother who lives there heard a noise and went upstairs and found the patient on the floor unresponsive for about 20mins at which time the EMS arrived. Patient was responding to the EMS but still confused(FS 140mg/dl by EMS). Patient was brought to the hospital and became aaox3 here without any intervention. Patient dosent remember what happened after she woke up to go to the bathroom. Patient mentioned she had about 3 episodes of loose stools few days ago that resolved by itself and was feeling at baseline until this am. No Palpitations/chest pain/Focal weakness/recent illness/nausea/vomiting/fevers/chills/decreased intake/sick contacts/dysuria/abd pain/chest pain/swelling of legs/face.    In the ED  /100, HR 100bpm, RR 18, Temp 98.4, RR 18, Sao2 98% on RA  Labs - Hb 11.4 , Na 134, K 5.6(hemolyzed) repeat K on vbg 5.8, Bicarb 13, Cr 2.5(as per patient Cr 2.5 since admitted for gout last year at outside hospital), BUN 37, ast 46, VBG lactate 4.9, Ph 7.25, pco2 37, bicarb 16, po2 37  CT head - No CT evidence for acute intracranial hemorrhage or acute large   territorial infarct.  Xray pelvis, knee, ankle:  Pelvis  No acute fracture or acute articular abnormality. The patient is post   bilateral hip arthroplasty with partially included hardware appearing in   good position. Partially calcified mass is noted in the right pelvis   measuring up to 5.7 cm. Surgical clips are noted as well.    Left shoulder  There is widening of the acromioclavicular joint raising a question of   separation. The glenohumeral joint appears maintained. No acute fracture   is seen. Telemetry leads    Left humerus  No acute fracture or acute articular abnormality. Several leads are   projecting over the distal humerus.    Left ankle  No acute fracture or acute articular abnormality. Bony productive changes   of the ankle joint. Otherwise, the ankle mortise is maintained. Midfoot   degenerative changes.    Left knee  The patient is post total left knee arthroplasty with hardware appearing   in good position. No evidence of knee joint effusion. Multiple ossified   structures are noted posterior to the knee joint compatible with   osteochondromatosis    Patient was given 1lit NS bolus(1lit LR shows as complete but as per patient she refused), morphine 4mg x 1, tylenol and admitted to tele     Discussed AC joint separation with Ortho team.  No acute intervention / left shoulder sling / NWB     #Syncope cardiac vs vasovagal   #Acute PE with +DVT (possible unprovoked give pelvic mass hx)  - s/p fall  - unresponsive for ~20mins as per patients mother and confused at scene when EMS arrived  - no complaints of palpitations/chest pain/sob/dizziness/blurred vision/focal weakness(apart from pain limiting motion now)  - tachy on admission ekg sinus tach, t wave inversion lead 3, and r heart strain   - trauma workup done in ed, left shoulder with widening of the acromioclavicular joint --.s/p sling in ed  - PE + on VQ scan ad +DVT in gastrocnemius   - monitor under tele  - orthostatics  - transthoracic echocardiography no RHS on echo, ef wnl       Medical charts / labs / imaging studies / PT and OT notes reviewed       PAST MEDICAL & SURGICAL HISTORY:  Hypertension      H/O kidney transplant      History of total left knee replacement      H/O total knee replacement, right          Hospital Course:    TODAY'S SUBJECTIVE & REVIEW OF SYMPTOMS:     Constitutional WNL   Cardio WNL   Resp WNL   GI WNL  Heme WNL  Endo WNL  Skin WNL  MSK WNL  Neuro LOC   Cognitive WNL  Psych WNL      MEDICATIONS  (STANDING):  allopurinol 300 milliGRAM(s) Oral daily  apixaban 10 milliGRAM(s) Oral two times a day  atorvastatin 10 milliGRAM(s) Oral at bedtime  cycloSPORINE  (SandIMMUNE)  Solution 50 milliGRAM(s) Oral daily  cycloSPORINE  (SandIMMUNE)  Solution 100 milliGRAM(s) Oral at bedtime  lactated ringers. 1000 milliLiter(s) (75 mL/Hr) IV Continuous <Continuous>  pantoprazole    Tablet 40 milliGRAM(s) Oral before breakfast  predniSONE   Tablet 5 milliGRAM(s) Oral daily  sodium bicarbonate 650 milliGRAM(s) Oral three times a day    MEDICATIONS  (PRN):  oxycodone    5 mG/acetaminophen 325 mG 1 Tablet(s) Oral every 6 hours PRN Moderate Pain (4 - 6)      FAMILY HISTORY:      Allergies    No Known Allergies    Intolerances        SOCIAL HISTORY:    [  ] Etoh  [  ] Smoking  [  ] Substance abuse     Home Environment:  [   ] Home Alone  [ x  ] Lives with Family  [   ] Home Health Aid    Dwelling:  [   ] Apartment  [ x  ] Private House  [   ] Adult Home  [   ] Skilled Nursing Facility      [   ] Short Term  [   ] Long Term  [ x  ] Stairs       Elevator [   ]    FUNCTIONAL STATUS PTA: (Check all that apply)  Ambulation: [ x   ]Independent    [   ] Dependent     [   ] Non-Ambulatory  Assistive Device: [   ] SA Cane  [   ]  Q Cane  [   ] Walker  [   ]  Wheelchair  ADL : [x   ] Independent  [    ]  Dependent       Vital Signs Last 24 Hrs  T(C): 36.2 (07 Jun 2023 12:12), Max: 37 (06 Jun 2023 23:45)  T(F): 97.1 (07 Jun 2023 12:12), Max: 98.6 (06 Jun 2023 23:45)  HR: 80 (07 Jun 2023 12:12) (80 - 92)  BP: 143/88 (07 Jun 2023 12:12) (132/84 - 157/84)  BP(mean): --  RR: 18 (07 Jun 2023 12:12) (18 - 18)  SpO2: 98% (07 Jun 2023 07:53) (96% - 98%)    Parameters below as of 07 Jun 2023 07:53  Patient On (Oxygen Delivery Method): room air          PHYSICAL EXAM: Awake & Alert  GENERAL: NAD  HEAD:  Normocephalic, left periorbital ecchymosis   CHEST/LUNG: Clear   HEART: S1S2+  ABDOMEN: Soft, Nontender  EXTREMITIES:  no calf tenderness, left shoulder in sling     NERVOUS SYSTEM:  Cranial Nerves 2-12 intact [   ] Abnormal  [   ]  ROM: WFL all extremities [   ]  Abnormal [ x  ]limited left shoulder   Motor Strength: WFL all extremities  [   ]  Abnormal [  x ]limited left shoulder rest 5/5   Sensation: intact to light touch [ x  ] Abnormal [   ]    FUNCTIONAL STATUS:  Bed Mobility: Independent [   ]  Supervision [ x  ]  Needs Assistance [   ]  N/A [   ]  Transfers: Independent [   ]  Supervision [ x  ]  Needs Assistance [   ]  N/A [   ]   Ambulation: Independent [   ]  Supervision [ x  ]  Needs Assistance [   ]  N/A [   ]  ADL: Independent [   ] Requires Assistance [ x  ] N/A [   ]      LABS:                        8.7    8.20  )-----------( 205      ( 07 Jun 2023 06:40 )             27.1     06-07    135  |  100  |  28<H>  ----------------------------<  84  3.9   |  21  |  2.1<H>    Ca    8.7      07 Jun 2023 06:40  Mg     1.7     06-07    TPro  6.0  /  Alb  3.3<L>  /  TBili  0.3  /  DBili  x   /  AST  26  /  ALT  13  /  AlkPhos  81  06-06    PT/INR - ( 05 Jun 2023 19:06 )   PT: 11.00 sec;   INR: 0.97 ratio         PTT - ( 06 Jun 2023 23:20 )  PTT:28.2 sec      RADIOLOGY & ADDITIONAL STUDIES:

## 2023-06-07 NOTE — OCCUPATIONAL THERAPY INITIAL EVALUATION ADULT - GENERAL OBSERVATIONS, REHAB EVAL
Pt received semi rosales in bed in [facility], agreeable to OT evaluation, +

## 2023-06-07 NOTE — PATIENT PROFILE ADULT - FALL HARM RISK - HARM RISK INTERVENTIONS

## 2023-06-07 NOTE — DISCHARGE NOTE NURSING/CASE MANAGEMENT/SOCIAL WORK - NSDCPEFALRISK_GEN_ALL_CORE
For information on Fall & Injury Prevention, visit: [email or web address] OR  [email or web address] OR  https://www.cdc.gov/steadi/patient.html

## 2023-06-07 NOTE — DISCHARGE NOTE PROVIDER - NSDCCPCAREPLAN_GEN_ALL_CORE_FT
PRINCIPAL DISCHARGE DIAGNOSIS  Diagnosis: Syncope  Assessment and Plan of Treatment: You came to the hospital for an episode of syncope thought to have been realated a DVT/PE you have- during which you sustained a left shoulder AC joint seperation for which you were evaluated by orthopedic surgery, placed in a stent and should follow up in 2 weeks from discharge. Take all medication as prescribed and follow up with your pmd and orthopedic surgeon. Return to the ED if you experience worsening symptoms      SECONDARY DISCHARGE DIAGNOSES  Diagnosis: Left shoulder pain  Assessment and Plan of Treatment:

## 2023-06-07 NOTE — PROGRESS NOTE ADULT - SUBJECTIVE AND OBJECTIVE BOX
YUAN MARTINEZ  53y Female    CHIEF COMPLAINT:    Patient is a 53y old  Female who presents with a chief complaint of     INTERVAL HPI/OVERNIGHT EVENTS:    Patient seen and examined.    ROS: All other systems are negative.    Vital Signs:    T(F): 98.6 (06-07-23 @ 05:23), Max: 98.6 (06-06-23 @ 23:45)  HR: 86 (06-07-23 @ 06:09) (76 - 92)  BP: 157/84 (06-07-23 @ 05:23) (132/84 - 157/84)  RR: 18 (06-07-23 @ 06:09) (18 - 18)  SpO2: 97% (06-07-23 @ 06:09) (96% - 97%)  I&O's Summary    06 Jun 2023 07:01  -  07 Jun 2023 07:00  --------------------------------------------------------  IN: 750 mL / OUT: 200 mL / NET: 550 mL      Daily     Daily   CAPILLARY BLOOD GLUCOSE          PHYSICAL EXAM:    GENERAL:  NAD  SKIN: No rashes or lesions  HENT: Atraumatic. Normocephalic. PERRL. Moist membranes.  NECK: Supple, No JVD. No lymphadenopathy.  PULMONARY: CTA B/L. No wheezing. No rales  CVS: Normal S1, S2. Rate and Rhythm are regular. No murmurs.  ABDOMEN/GI: Soft, Nontender, Nondistended; BS present  EXTREMITIES: Peripheral pulses intact. No edema B/L LE.  NEUROLOGIC:  No motor or sensory deficit.  PSYCH: Alert & oriented x 3    Consultant(s) Notes Reviewed:  [x ] YES  [ ] NO  Care Discussed with Consultants/Other Providers [ x] YES  [ ] NO    EKG reviewed  Telemetry reviewed    LABS:                        8.8    9.87  )-----------( 209      ( 06 Jun 2023 20:55 )             27.8     06-06    136  |  103  |  30<H>  ----------------------------<  128<H>  4.6   |  21  |  2.2<H>    Ca    8.9      06 Jun 2023 20:55    TPro  6.0  /  Alb  3.3<L>  /  TBili  0.3  /  DBili  x   /  AST  26  /  ALT  13  /  AlkPhos  81  06-06    PT/INR - ( 05 Jun 2023 19:06 )   PT: 11.00 sec;   INR: 0.97 ratio         PTT - ( 06 Jun 2023 23:20 )  PTT:28.2 sec    Trop <0.01, CKMB --, , 06-06-23 @ 08:22  Trop 0.02, CKMB --, CK --, 06-05-23 @ 21:22  Trop --, CKMB --, , 06-05-23 @ 12:39  Trop <0.01, CKMB --, CK --, 06-05-23 @ 06:10      Culture - Urine (collected 05 Jun 2023 07:45)  Source: Clean Catch Clean Catch (Midstream)  Final Report (06 Jun 2023 14:49):    <10,000 CFU/mL Normal Urogenital Klaudia    Culture - Blood (collected 05 Jun 2023 06:30)  Source: .Blood Blood-Peripheral  Preliminary Report (06 Jun 2023 14:01):    No growth to date.    Culture - Blood (collected 05 Jun 2023 06:30)  Source: .Blood Blood-Peripheral  Preliminary Report (06 Jun 2023 14:01):    No growth to date.        RADIOLOGY & ADDITIONAL TESTS:    < from: NM Pulmonary Ventilation/Perfusion Scan (06.05.23 @ 22:32) >      IMPRESSION:  HIGH PROBABILITY FOR PULMONARY EMBOLISM.  SEGMENTAL IN CHARACTER PERFUSION DEFECTS IN THE RIGHT POSTERIOR BASAL AND   ANTERIOR BASAL SEGMENTS NOT MATCHED BY VENTILATION SCAN ABNORMALITIES.    < end of copied text >  < from: [facility] Duplex Lower Ext Vein Scan, Bilat (06.05.23 @ 18:26) >  Impression:    DVT noted in the left gastrocnemius vein  Compressible echogenic band noted in the right popliteal vein    < end of copied text >  < from: TTE Echo Complete w/ Contrast w/ Doppler (06.06.23 @ 09:27) >    Summary:   1. Left ventricular ejection fraction, by visual estimation, is 65 to   70%.   2. Normal global left ventricular systolic function.   3. Endocardial visualization was enhanced with intravenous echo contrast.    < end of copied text >    Imaging or report Personally Reviewed:  [x ] YES  [ ] NO    Medications:  Standing  allopurinol 300 milliGRAM(s) Oral daily  apixaban 10 milliGRAM(s) Oral two times a day  atorvastatin 10 milliGRAM(s) Oral at bedtime  cycloSPORINE  (SandIMMUNE)  Solution 50 milliGRAM(s) Oral daily  cycloSPORINE  (SandIMMUNE)  Solution 100 milliGRAM(s) Oral at bedtime  lactated ringers. 1000 milliLiter(s) IV Continuous <Continuous>  pantoprazole    Tablet 40 milliGRAM(s) Oral before breakfast  predniSONE   Tablet 5 milliGRAM(s) Oral daily  sodium bicarbonate 650 milliGRAM(s) Oral three times a day    PRN Meds  oxycodone    5 mG/acetaminophen 325 mG 1 Tablet(s) Oral every 6 hours PRN      Case discussed with resident    Care discussed with pt/family           YUAN MARTINEZ  53y Female    CHIEF COMPLAINT:    Patient is a 53y old  Female who presents with a chief complaint of     INTERVAL HPI/OVERNIGHT EVENTS:    Patient seen and examined.    ROS: All other systems are negative.    Vital Signs:    T(F): 98.6 (06-07-23 @ 05:23), Max: 98.6 (06-06-23 @ 23:45)  HR: 86 (06-07-23 @ 06:09) (76 - 92)  BP: 157/84 (06-07-23 @ 05:23) (132/84 - 157/84)  RR: 18 (06-07-23 @ 06:09) (18 - 18)  SpO2: 97% (06-07-23 @ 06:09) (96% - 97%)  I&O's Summary    06 Jun 2023 07:01  -  07 Jun 2023 07:00  --------------------------------------------------------  IN: 750 mL / OUT: 200 mL / NET: 550 mL      Daily     Daily   CAPILLARY BLOOD GLUCOSE          PHYSICAL EXAM:    GENERAL:  NAD  SKIN: No rashes or lesions  HENT: Atraumatic. Normocephalic. PERRL. Moist membranes.  NECK: Supple, No JVD. No lymphadenopathy.  PULMONARY: CTA B/L. No wheezing. No rales  CVS: Normal S1, S2. Rate and Rhythm are regular. No murmurs.  ABDOMEN/GI: Soft, Nontender, Nondistended; BS present  EXTREMITIES: Peripheral pulses intact. No edema B/L LE.  NEUROLOGIC:  No motor or sensory deficit.  PSYCH: Alert & oriented x 3    Consultant(s) Notes Reviewed:  [x ] YES  [ ] NO  Care Discussed with Consultants/Other Providers [ x] YES  [ ] NO    EKG reviewed  Telemetry reviewed    LABS:                        8.8    9.87  )-----------( 209      ( 06 Jun 2023 20:55 )             27.8     06-06    136  |  103  |  30<H>  ----------------------------<  128<H>  4.6   |  21  |  2.2<H>    Ca    8.9      06 Jun 2023 20:55    TPro  6.0  /  Alb  3.3<L>  /  TBili  0.3  /  DBili  x   /  AST  26  /  ALT  13  /  AlkPhos  81  06-06    PT/INR - ( 05 Jun 2023 19:06 )   PT: 11.00 sec;   INR: 0.97 ratio         PTT - ( 06 Jun 2023 23:20 )  PTT:28.2 sec    Trop <0.01, CKMB --, , 06-06-23 @ 08:22  Trop 0.02, CKMB --, CK --, 06-05-23 @ 21:22  Trop --, CKMB --, , 06-05-23 @ 12:39  Trop <0.01, CKMB --, CK --, 06-05-23 @ 06:10      Culture - Urine (collected 05 Jun 2023 07:45)  Source: Clean Catch Clean Catch (Midstream)  Final Report (06 Jun 2023 14:49):    <10,000 CFU/mL Normal Urogenital Klaudia    Culture - Blood (collected 05 Jun 2023 06:30)  Source: .Blood Blood-Peripheral  Preliminary Report (06 Jun 2023 14:01):    No growth to date.    Culture - Blood (collected 05 Jun 2023 06:30)  Source: .Blood Blood-Peripheral  Preliminary Report (06 Jun 2023 14:01):    No growth to date.        RADIOLOGY & ADDITIONAL TESTS:    < from: NM Pulmonary Ventilation/Perfusion Scan (06.05.23 @ 22:32) >      IMPRESSION:  HIGH PROBABILITY FOR PULMONARY EMBOLISM.  SEGMENTAL IN CHARACTER PERFUSION DEFECTS IN THE RIGHT POSTERIOR BASAL AND   ANTERIOR BASAL SEGMENTS NOT MATCHED BY VENTILATION SCAN ABNORMALITIES.    < end of copied text >  < from: VA Duplex Lower Ext Vein Scan, Bilat (06.05.23 @ 18:26) >  Impression:    DVT noted in the left gastrocnemius vein  Compressible echogenic band noted in the right popliteal vein    < end of copied text >  < from: TTE Echo Complete w/ Contrast w/ Doppler (06.06.23 @ 09:27) >    Summary:   1. Left ventricular ejection fraction, by visual estimation, is 65 to   70%.   2. Normal global left ventricular systolic function.   3. Endocardial visualization was enhanced with intravenous echo contrast.    < end of copied text >  < from: EEG (06.06.23 @ 12:40) >  Impression:  Normal    < end of copied text >    Imaging or report Personally Reviewed:  [x ] YES  [ ] NO    Medications:  Standing  allopurinol 300 milliGRAM(s) Oral daily  apixaban 10 milliGRAM(s) Oral two times a day  atorvastatin 10 milliGRAM(s) Oral at bedtime  cycloSPORINE  (SandIMMUNE)  Solution 50 milliGRAM(s) Oral daily  cycloSPORINE  (SandIMMUNE)  Solution 100 milliGRAM(s) Oral at bedtime  lactated ringers. 1000 milliLiter(s) IV Continuous <Continuous>  pantoprazole    Tablet 40 milliGRAM(s) Oral before breakfast  predniSONE   Tablet 5 milliGRAM(s) Oral daily  sodium bicarbonate 650 milliGRAM(s) Oral three times a day    PRN Meds  oxycodone    5 mG/acetaminophen 325 mG 1 Tablet(s) Oral every 6 hours PRN      Case discussed with resident    Care discussed with pt/family           YUAN MARTINEZ  53y Female    CHIEF COMPLAINT:    Patient is a 53y old  Female who presents with a chief complaint of     INTERVAL HPI/OVERNIGHT EVENTS:    Patient seen and examined. Feels good. No dizziness. No sob. No palpitations. No cp    ROS: All other systems are negative.    Vital Signs:    T(F): 98.6 (06-07-23 @ 05:23), Max: 98.6 (06-06-23 @ 23:45)  HR: 86 (06-07-23 @ 06:09) (76 - 92)  BP: 157/84 (06-07-23 @ 05:23) (132/84 - 157/84)  RR: 18 (06-07-23 @ 06:09) (18 - 18)  SpO2: 97% (06-07-23 @ 06:09) (96% - 97%)  I&O's Summary    06 Jun 2023 07:01  -  07 Jun 2023 07:00  --------------------------------------------------------  IN: 750 mL / OUT: 200 mL / NET: 550 mL      Daily     Daily   CAPILLARY BLOOD GLUCOSE          PHYSICAL EXAM:    GENERAL:  NAD  SKIN: No rashes or lesions  HENT: Atraumatic. Normocephalic. PERRL. Moist membranes.  NECK: Supple, No JVD. No lymphadenopathy.  PULMONARY: CTA B/L. No wheezing. No rales  CVS: Normal S1, S2. Rate and Rhythm are regular. No murmurs.  ABDOMEN/GI: Soft, Nontender, Nondistended; BS present  EXTREMITIES: Peripheral pulses intact. No edema B/L LE.  NEUROLOGIC:  No motor or sensory deficit.  PSYCH: Alert & oriented x 3    Consultant(s) Notes Reviewed:  [x ] YES  [ ] NO  Care Discussed with Consultants/Other Providers [ x] YES  [ ] NO    EKG reviewed  Telemetry reviewed    LABS:                        8.8    9.87  )-----------( 209      ( 06 Jun 2023 20:55 )             27.8     06-06    136  |  103  |  30<H>  ----------------------------<  128<H>  4.6   |  21  |  2.2<H>    Ca    8.9      06 Jun 2023 20:55    TPro  6.0  /  Alb  3.3<L>  /  TBili  0.3  /  DBili  x   /  AST  26  /  ALT  13  /  AlkPhos  81  06-06    PT/INR - ( 05 Jun 2023 19:06 )   PT: 11.00 sec;   INR: 0.97 ratio         PTT - ( 06 Jun 2023 23:20 )  PTT:28.2 sec    Trop <0.01, CKMB --, , 06-06-23 @ 08:22  Trop 0.02, CKMB --, CK --, 06-05-23 @ 21:22  Trop --, CKMB --, , 06-05-23 @ 12:39  Trop <0.01, CKMB --, CK --, 06-05-23 @ 06:10      Culture - Urine (collected 05 Jun 2023 07:45)  Source: Clean Catch Clean Catch (Midstream)  Final Report (06 Jun 2023 14:49):    <10,000 CFU/mL Normal Urogenital Klaudia    Culture - Blood (collected 05 Jun 2023 06:30)  Source: .Blood Blood-Peripheral  Preliminary Report (06 Jun 2023 14:01):    No growth to date.    Culture - Blood (collected 05 Jun 2023 06:30)  Source: .Blood Blood-Peripheral  Preliminary Report (06 Jun 2023 14:01):    No growth to date.        RADIOLOGY & ADDITIONAL TESTS:    < from: NM Pulmonary Ventilation/Perfusion Scan (06.05.23 @ 22:32) >      IMPRESSION:  HIGH PROBABILITY FOR PULMONARY EMBOLISM.  SEGMENTAL IN CHARACTER PERFUSION DEFECTS IN THE RIGHT POSTERIOR BASAL AND   ANTERIOR BASAL SEGMENTS NOT MATCHED BY VENTILATION SCAN ABNORMALITIES.    < end of copied text >  < from: VA Duplex Lower Ext Vein Scan, Bilat (06.05.23 @ 18:26) >  Impression:    DVT noted in the left gastrocnemius vein  Compressible echogenic band noted in the right popliteal vein    < end of copied text >  < from: TTE Echo Complete w/ Contrast w/ Doppler (06.06.23 @ 09:27) >    Summary:   1. Left ventricular ejection fraction, by visual estimation, is 65 to   70%.   2. Normal global left ventricular systolic function.   3. Endocardial visualization was enhanced with intravenous echo contrast.    < end of copied text >  < from: EEG (06.06.23 @ 12:40) >  Impression:  Normal    < end of copied text >    Imaging or report Personally Reviewed:  [x ] YES  [ ] NO    Medications:  Standing  allopurinol 300 milliGRAM(s) Oral daily  apixaban 10 milliGRAM(s) Oral two times a day  atorvastatin 10 milliGRAM(s) Oral at bedtime  cycloSPORINE  (SandIMMUNE)  Solution 50 milliGRAM(s) Oral daily  cycloSPORINE  (SandIMMUNE)  Solution 100 milliGRAM(s) Oral at bedtime  lactated ringers. 1000 milliLiter(s) IV Continuous <Continuous>  pantoprazole    Tablet 40 milliGRAM(s) Oral before breakfast  predniSONE   Tablet 5 milliGRAM(s) Oral daily  sodium bicarbonate 650 milliGRAM(s) Oral three times a day    PRN Meds  oxycodone    5 mG/acetaminophen 325 mG 1 Tablet(s) Oral every 6 hours PRN      Case discussed with resident    Care discussed with pt/family

## 2023-06-07 NOTE — OCCUPATIONAL THERAPY INITIAL EVALUATION ADULT - NS ASR WT BEARING DETAIL LUE
discussed with ortho @ [id number], no ROM @ shoulder, elbow flexion OK, no external rotation, wrist/digits ROM OK/nonweight-bearing

## 2023-06-07 NOTE — DISCHARGE NOTE PROVIDER - NS AS DC PROVIDER CONTACT Y/N MULTI
Yes
You can access the FollowMyHealth Patient Portal offered by [facility] by registering at the following website: [email or web address]. By joining [employer]’s FollowMyHealth portal, you will also be able to view your health information using other applications (apps) compatible with our system.

## 2023-06-07 NOTE — CONSULT NOTE ADULT - ASSESSMENT
IMPRESSION: Rehab of syncope / left shoulder separation / PE on AC / HTN, hx kidney transplant on cyclosporine and prednisone, gout, hx b/l hip replacement and left knee replacement    PRECAUTIONS: [   ] Cardiac  [   ] Respiratory  [   ] Seizures [   ] Contact Isolation  [   ] Droplet Isolation  [   ] Other    Weight Bearing Status: NWB LUE    RECOMMENDATION: outpatient f/u with orthopedic    Out of Bed to Chair     DVT/Decubiti Prophylaxis    REHAB PLAN:     [  x  ] Bedside P/T 3-5 times a week   [   x ]   Bedside O/T  2-3 times a week             [    ] Speech Therapy               [    ]  No Rehab Therapy Indicated   Conditioning/ROM                                    ADL  Bed Mobility                                               Conditioning/ROM  Transfers                                                     Bed Mobility  Sitting /Standing Balance                         Transfers                                        Gait Training                                               Sitting/Standing Balance  Stair Training [   ]Applicable                    Home equipment Eval                                                                        Splinting  [   ] Only      GOALS:   ADL   [  x  ]   Independent                    Transfers  [x    ] Independent                          Ambulation  [   x ] Independent     [    x ] With device                            [    ]  CG                                                         [    ]  CG                                                                  [    ] CG                            [    ] Min A                                                   [    ] Min A                                                              [    ] Min  A          DISCHARGE PLAN:   [    ]  Good candidate for Intensive Rehabilitation/Hospital based                                             Will tolerate 3hrs Intensive Rehab Daily                                       [     ]  Short Term Rehab in Skilled Nursing Facility                                       [  x   ]  Home with Outpatient or VN services - outpatient OT - we will arrange                                          [     ]  Possible Candidate for Intensive Hospital based Rehab

## 2023-06-07 NOTE — PROGRESS NOTE ADULT - ASSESSMENT
52 y/o F with PMH HTN, hx kidney transplant on cyclosporine and prednisone, gout, htn, hx b/l hip replacement and left knee replacement BIBEMS after a fall at home. Patient was at baseline until this am when she woke up to walk to the bathroom and had a fall. Patients mother who lives there heard a noise and went upstairs and found the patient on the floor unresponsive for about 20mins at which time the EMS arrived. Patient was responding to the EMS but still confused.     Syncope  Acute PE  S/P Renal transplant  HTN             PLAN: 54 y/o F with PMH HTN, hx kidney transplant on cyclosporine and prednisone, gout, htn, hx b/l hip replacement and left knee replacement BIBEMS after a fall at home. Patient was at baseline until this am when she woke up to walk to the bathroom and had a fall. Patients mother who lives there heard a noise and went upstairs and found the patient on the floor unresponsive for about 20mins at which time the EMS arrived. Patient was responding to the EMS but still confused.     Syncope  Acute PE / Acute DVT b/l LE  S/P Renal transplant  HTN  CKD-3  Metabolic acidosis  Pelvic mass  Widening of L AC joint             PLAN:    ·	Tele reviewed. No events.   ·	Routine EEG is normal  ·	Orthostatic vitals are negative.   ·	ECHO showed EF is 65-70%  ·	VQ scan was read as high probability for PE  ·	Venous doppler of LE noted. DVT in L gastrocnemius vein  ·	Cont Eliquis 10 mg po q 12h for one week, then 5 mg po q 12h  ·	Pt had lactic acidosis on admission, which now has resolved. S/P 1L of IV NS in the ER  ·	H/O CKD-4. PT's baseline Cr is 2.5. Stable. Has metabolic acidosis on admission. Was started on NaHCO3 650 mg po q 8h. Will cont it  ·	H/O renal transplant. Nephrology eval prior to d/c  ·	X-ray showed widening of L AC joint. Ortho recommended, arm sling and out pt f/u  ·	X-ray pelvis showed 5.7 cm R pelvis mass with surgical clips     52 y/o F with PMH HTN, hx kidney transplant on cyclosporine and prednisone, gout, htn, hx b/l hip replacement and left knee replacement BIBEMS after a fall at home. Patient was at baseline until this am when she woke up to walk to the bathroom and had a fall. Patients mother who lives there heard a noise and went upstairs and found the patient on the floor unresponsive for about 20mins at which time the EMS arrived. Patient was responding to the EMS but still confused.     Syncope  Acute PE / Acute DVT b/l LE  H/O Clotting disorder.   S/P Renal transplant  HTN  CKD-3  Metabolic acidosis  Pelvic mass  Widening of L AC joint             PLAN:    ·	Tele reviewed. No events.   ·	Routine EEG is normal  ·	Orthostatic vitals are negative.   ·	ECHO showed EF is 65-70%  ·	VQ scan was read as high probability for PE  ·	Venous doppler of LE noted. DVT in L gastrocnemius vein  ·	Pt states that she was diagnosed with some clotting disorder when she was 21 years old. Pt is advised to f/u with her Hematologist  ·	Cont Eliquis 10 mg po q 12h for one week, then 5 mg po q 12h  ·	Pt had lactic acidosis on admission, which now has resolved. S/P 1L of IV NS in the ER  ·	H/O CKD-4. PT's baseline Cr is 2.5. Stable. Has metabolic acidosis on admission. Was started on NaHCO3 650 mg po q 8h. Will cont it  ·	Lactic acidosis has resolved. Lactate level is 1.1  ·	H/O renal transplant. Nephrology eval prior to d/c  ·	X-ray showed widening of L AC joint. Ortho recommended, arm sling and out pt f/u  ·	X-ray pelvis showed 5.7 cm R pelvis mass with surgical clips likely renal transplant.   ·	Urine and blood cxs are negative.   ·	Offered the pt to see Nephrologist prior to d/c. She is refusing to see nephrologist here. Wants to f/u with her own Nephrologist at [place].   ·	D/C home    * Med rec reviewed. Plan of care d/w the pt. Time spent 41 minutes.

## 2023-06-07 NOTE — DISCHARGE NOTE NURSING/CASE MANAGEMENT/SOCIAL WORK - PATIENT PORTAL LINK FT
You can access the FollowMyHealth Patient Portal offered by [facility] by registering at the following website: [email or web address]. By joining [facility]’s FollowMyHealth portal, you will also be able to view your health information using other applications (apps) compatible with our system.

## 2023-06-07 NOTE — OCCUPATIONAL THERAPY INITIAL EVALUATION ADULT - RANGE OF MOTION EXAMINATION, UPPER EXTREMITY
LUE shoulder not assessed 2* to ROM restriction, elbow/wrist/digits WFL/Right UE Active ROM was WNL (within normal limits)

## 2023-06-07 NOTE — DISCHARGE NOTE PROVIDER - HOSPITAL COURSE
52 y/o F with PMH HTN, hx kidney transplant on cyclosporine and prednisone, gout, htn, hx b/l hip replacement and left knee replacement BIBEMS after a fall at home. Patient was at baseline until this am when she woke up to walk to the bathroom and had a fall. Patients mother who lives there heard a noise and went upstairs and found the patient on the floor unresponsive for about 20mins at which time the EMS arrived. Patient was responding to the EMS but still confused(FS 140mg/dl by EMS). Patient was brought to the hospital and became aaox3 here without any intervention. Patient dosent remember what happened after she woke up to go to the bathroom. Patient mentioned she had about 3 episodes of loose stools few days ago that resolved by itself and was feeling at baseline until this am. No Palpitations/chest pain/Focal weakness/recent illness/nausea/vomiting/fevers/chills/decreased intake/sick contacts/dysuria/abd pain/chest pain/swelling of legs/face.  trauma workup was + for L shoulder AC joint dislocation or which ortho placed in splint and recs for op f/u     Patient was found to have DVT/PE started on Eliquis  wished to leave prior to nephro eval   stable for op f/u     52 y/o F with PMH HTN, hx kidney transplant on cyclosporine and prednisone, gout, htn, hx b/l hip replacement and left knee replacement BIBEMS after a fall at home. Patient was at baseline until this am when she woke up to walk to the bathroom and had a fall. Patients mother who lives there heard a noise and went upstairs and found the patient on the floor unresponsive for about 20mins at which time the EMS arrived. Patient was responding to the EMS but still confused.     Syncope  Acute PE / Acute DVT b/l LE  H/O Clotting disorder.   S/P Renal transplant  HTN  CKD-3  Metabolic acidosis  Pelvic mass  Widening of L AC joint             PLAN:    Tele reviewed. No events.   Routine EEG is normal  Orthostatic vitals are negative.   ECHO showed EF is 65-70%  VQ scan was read as high probability for PE  Venous doppler of LE noted. DVT in L gastrocnemius vein  Pt states that she was diagnosed with some clotting disorder when she was 21 years old. Pt is advised to f/u with her Hematologist  Cont Eliquis 10 mg po q 12h for one week, then 5 mg po q 12h  Pt had lactic acidosis on admission, which now has resolved. S/P 1L of IV NS in the ER  H/O CKD-4. PT's baseline Cr is 2.5. Stable. Has metabolic acidosis on admission. Was started on NaHCO3 650 mg po q 8h. Will cont it  Lactic acidosis has resolved. Lactate level is 1.1  H/O renal transplant. Nephrology eval prior to d/c  X-ray showed widening of L AC joint. Ortho recommended, arm sling and out pt f/u  X-ray pelvis showed 5.7 cm R pelvis mass with surgical clips likely renal transplant.   Urine and blood cxs are negative.   Offered the pt to see Nephrologist prior to d/c. She is refusing to see nephrologist here. Wants to f/u with her own Nephrologist at [place].   D/C home

## 2023-06-07 NOTE — DISCHARGE NOTE PROVIDER - NSDCMRMEDTOKEN_GEN_ALL_CORE_FT
allopurinol 300 mg oral tablet: 1 orally once a day  cycloSPORINE 100 mg oral capsule: 1 orally once a day (at bedtime)  cycloSPORINE 50 mg oral capsule: 1 tab(s) orally once a day in the am  Lescol 20 mg oral capsule: 1 orally  lisinopril 5 mg oral tablet: 1 orally once a day  prednisoLONE 5 mg oral tablet: 1 orally once a day  PriLOSEC 20 mg oral delayed release capsule: 1 orally once a day  sodium bicarbonate 650 mg oral tablet: 1 tab(s) orally 3 times a day   allopurinol 300 mg oral tablet: 1 orally once a day  apixaban 5 mg oral tablet: 2 tab(s) orally 2 times a day Take 2 tabs by mouth for 6 more days. Then one tab by mouth twice daily.  cycloSPORINE 100 mg oral capsule: 1 orally once a day (at bedtime)  cycloSPORINE 50 mg oral capsule: 1 tab(s) orally once a day in the am  Lescol 20 mg oral capsule: 1 orally  lisinopril 5 mg oral tablet: 1 orally once a day  prednisoLONE 5 mg oral tablet: 1 orally once a day  PriLOSEC 20 mg oral delayed release capsule: 1 orally once a day  sodium bicarbonate 650 mg oral tablet: 1 tab(s) orally 3 times a day

## 2023-06-09 PROBLEM — I10 ESSENTIAL (PRIMARY) HYPERTENSION: Chronic | Status: ACTIVE | Noted: 2023-06-05

## 2023-06-10 LAB
CULTURE RESULTS: SIGNIFICANT CHANGE UP
CULTURE RESULTS: SIGNIFICANT CHANGE UP
SPECIMEN SOURCE: SIGNIFICANT CHANGE UP
SPECIMEN SOURCE: SIGNIFICANT CHANGE UP

## 2023-06-12 ENCOUNTER — APPOINTMENT (OUTPATIENT)
Dept: ORTHOPEDIC SURGERY | Facility: CLINIC | Age: 53
End: 2023-06-12
Payer: MEDICARE

## 2023-06-12 VITALS — HEIGHT: 66 IN | BODY MASS INDEX: 34.55 KG/M2 | WEIGHT: 215 LBS

## 2023-06-12 DIAGNOSIS — M25.512 PAIN IN LEFT SHOULDER: ICD-10-CM

## 2023-06-12 DIAGNOSIS — Z86.79 PERSONAL HISTORY OF OTHER DISEASES OF THE CIRCULATORY SYSTEM: ICD-10-CM

## 2023-06-12 DIAGNOSIS — Z78.9 OTHER SPECIFIED HEALTH STATUS: ICD-10-CM

## 2023-06-12 DIAGNOSIS — Z94.0 KIDNEY TRANSPLANT STATUS: ICD-10-CM

## 2023-06-12 DIAGNOSIS — S43.102A UNSPECIFIED DISLOCATION OF LEFT ACROMIOCLAVICULAR JOINT, INITIAL ENCOUNTER: ICD-10-CM

## 2023-06-12 PROCEDURE — 99213 OFFICE O/P EST LOW 20 MIN: CPT

## 2023-06-12 PROCEDURE — 73060 X-RAY EXAM OF HUMERUS: CPT | Mod: LT

## 2023-06-12 RX ORDER — SODIUM BICARBONATE 650 MG/1
TABLET ORAL
Refills: 0 | Status: DISCONTINUED | COMMUNITY

## 2023-06-12 RX ORDER — ALLOPURINOL 200 MG/1
TABLET ORAL
Refills: 0 | Status: DISCONTINUED | COMMUNITY

## 2023-06-12 RX ORDER — PREDNISONE 50 MG/1
TABLET ORAL
Refills: 0 | Status: DISCONTINUED | COMMUNITY

## 2023-06-12 RX ORDER — CYCLOSPORINE 25 MG/1
CAPSULE, GELATIN COATED ORAL
Refills: 0 | Status: DISCONTINUED | COMMUNITY

## 2023-06-12 NOTE — HISTORY OF PRESENT ILLNESS
[de-identified] : Patient is a 53-year-old female coming in today for evaluation of acute left shoulder pain.  She woke up at 4:00 in the morning on 5 June 2023 and had a syncopal episode falling on the left side of her body.  She was seen in the hospital where x-rays were taken.  These were negative for acute fracture however she was diagnosed with an AC joint separation.  She was placed in a sling and told to follow-up with orthopedics.

## 2023-06-12 NOTE — ASSESSMENT
[FreeTextEntry1] : Reviewed repeat negative x-rays with patient.  Discussed with her in detail that she should remain in the sling for an additional week and then start physical therapy first thing Monday.  Gentle range of motion of the wrist, hand and shoulder was reinforced.  I showed patient pendulum exercises as well as other gentle range of motion exercises to prevent stiffness over the course of the next week.  She will continue with OTC analgesics as needed for pain.  She will follow-up in our sports medicine department in approximately 4 weeks.  I did send her for a ASAP MRI of the right shoulder due to concerns for a full-thickness, traumatic tear of the rotator cuff.  I will call her with the MRI results once I receive them.  She expressed understanding and had no additional questions or concerns.

## 2023-06-12 NOTE — PHYSICAL EXAM
[Left] : left shoulder [de-identified] : General appearance the patient is unremarkable, well developed, well nourished, well groomed with no deformities.  Patient is alert and oriented.  Patient is able to communicate clearly.  Visible skin on the head, face, right upper extremity, left upper extremity and bilateral lower extremities are normal showing no rashes, lesions or ulcers.  Normal peripheral vascular system.  Normal coordination, mood and affect.  [] : no scapular winging [FreeTextEntry9] : Significant loss of range of motion in all planes secondary to severe pain, swelling and bruising. [de-identified] : 3 out of 5 strength noted in all planes.  Significant discomfort with strength testing. [TWNoteComboBox7] : False

## 2023-06-12 NOTE — DATA REVIEWED
[Left] : left [Shoulder] : shoulder [Report was reviewed and noted in the chart] : The report was reviewed and noted in the chart [I independently reviewed and interpreted images and report] : I independently reviewed and interpreted images and report [FreeTextEntry1] : X-rays from [facility] as well as x-rays from today show no acute fracture, subluxation or dislocation.  She does have a large widening at the AC joint consistent with a AC joint separation.

## 2023-06-20 DIAGNOSIS — D64.9 ANEMIA, UNSPECIFIED: ICD-10-CM

## 2023-06-20 DIAGNOSIS — K21.9 GASTRO-ESOPHAGEAL REFLUX DISEASE WITHOUT ESOPHAGITIS: ICD-10-CM

## 2023-06-20 DIAGNOSIS — Z96.652 PRESENCE OF LEFT ARTIFICIAL KNEE JOINT: ICD-10-CM

## 2023-06-20 DIAGNOSIS — I12.9 HYPERTENSIVE CHRONIC KIDNEY DISEASE WITH STAGE 1 THROUGH STAGE 4 CHRONIC KIDNEY DISEASE, OR UNSPECIFIED CHRONIC KIDNEY DISEASE: ICD-10-CM

## 2023-06-20 DIAGNOSIS — E87.5 HYPERKALEMIA: ICD-10-CM

## 2023-06-20 DIAGNOSIS — Z94.0 KIDNEY TRANSPLANT STATUS: ICD-10-CM

## 2023-06-20 DIAGNOSIS — M10.9 GOUT, UNSPECIFIED: ICD-10-CM

## 2023-06-20 DIAGNOSIS — Z79.52 LONG TERM (CURRENT) USE OF SYSTEMIC STEROIDS: ICD-10-CM

## 2023-06-20 DIAGNOSIS — S43.102A UNSPECIFIED DISLOCATION OF LEFT ACROMIOCLAVICULAR JOINT, INITIAL ENCOUNTER: ICD-10-CM

## 2023-06-20 DIAGNOSIS — N17.9 ACUTE KIDNEY FAILURE, UNSPECIFIED: ICD-10-CM

## 2023-06-20 DIAGNOSIS — Z79.899 OTHER LONG TERM (CURRENT) DRUG THERAPY: ICD-10-CM

## 2023-06-20 DIAGNOSIS — I82.403 ACUTE EMBOLISM AND THROMBOSIS OF UNSPECIFIED DEEP VEINS OF LOWER EXTREMITY, BILATERAL: ICD-10-CM

## 2023-06-20 DIAGNOSIS — N18.30 CHRONIC KIDNEY DISEASE, STAGE 3 UNSPECIFIED: ICD-10-CM

## 2023-06-20 DIAGNOSIS — Q60.0 RENAL AGENESIS, UNILATERAL: ICD-10-CM
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
## 2023-06-20 DIAGNOSIS — R19.00 INTRA-ABDOMINAL AND PELVIC SWELLING, MASS AND LUMP, UNSPECIFIED SITE: ICD-10-CM

## 2023-06-20 DIAGNOSIS — R55 SYNCOPE AND COLLAPSE: ICD-10-CM

## 2023-06-20 DIAGNOSIS — E87.20 ACIDOSIS, UNSPECIFIED: ICD-10-CM

## 2023-06-20 DIAGNOSIS — I26.99 OTHER PULMONARY EMBOLISM WITHOUT ACUTE COR PULMONALE: ICD-10-CM

## 2023-06-20 DIAGNOSIS — Z96.643 PRESENCE OF ARTIFICIAL HIP JOINT, BILATERAL: ICD-10-CM
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
## 2023-06-23 RX ORDER — TRAMADOL HYDROCHLORIDE 50 MG/1
50 TABLET, COATED ORAL
Qty: 15 | Refills: 0 | Status: ACTIVE | COMMUNITY
Start: 2023-06-23 | End: 1900-01-01

## 2023-06-29 ENCOUNTER — APPOINTMENT (OUTPATIENT)
Dept: ORTHOPEDIC SURGERY | Facility: CLINIC | Age: 53
End: 2023-06-29
Payer: MEDICARE

## 2023-06-29 DIAGNOSIS — S46.012D STRAIN OF MUSCLE(S) AND TENDON(S) OF THE ROTATOR CUFF OF LEFT SHOULDER, SUBSEQUENT ENCOUNTER: ICD-10-CM

## 2023-06-29 PROCEDURE — 99214 OFFICE O/P EST MOD 30 MIN: CPT

## 2023-06-29 NOTE — HISTORY OF PRESENT ILLNESS
[de-identified] : Patient is here here for evaluation of her left shoulder she had fallen 2 weeks ago but also fell back in November MRI from stand-up is a poor quality film but report has 3 cm tears of the supraspinatus infraspinatus and subscapularis with a dislocated biceps tendon\par \par She has ecchymosis over the arm she is got very guarded range of motion 30 degrees forward flexion 20 degrees abduction exam is very limited secondary to her pain\par \par I will send her to pain management she is currently taking tramadol, I will have her see  for second opinion, evaluation and treatment, since the quality of her tissue looks very poor and with  large to massive tears of unknown timeframe, she may be a candidate for a reverse total shoulder or an anterior and superior capsular reconstruction, she will bring in the disc from stand up for review at that time, I also recommend d/c the sling and starting therapy

## 2023-06-30 ENCOUNTER — APPOINTMENT (OUTPATIENT)
Dept: PAIN MANAGEMENT | Facility: CLINIC | Age: 53
End: 2023-06-30

## 2023-07-06 ENCOUNTER — APPOINTMENT (OUTPATIENT)
Dept: VASCULAR SURGERY | Facility: CLINIC | Age: 53
End: 2023-07-06
Payer: MEDICARE

## 2023-07-06 VITALS — HEIGHT: 66 IN | WEIGHT: 198 LBS | BODY MASS INDEX: 31.82 KG/M2

## 2023-07-06 VITALS — DIASTOLIC BLOOD PRESSURE: 74 MMHG | SYSTOLIC BLOOD PRESSURE: 137 MMHG

## 2023-07-06 DIAGNOSIS — I26.92 SADDLE EMBOLUS OF PULMONARY ARTERY W/OUT ACUTE COR PULMONALE: ICD-10-CM

## 2023-07-06 DIAGNOSIS — M79.89 OTHER SPECIFIED SOFT TISSUE DISORDERS: ICD-10-CM

## 2023-07-06 DIAGNOSIS — I82.442 ACUTE EMBOLISM AND THROMBOSIS OF LEFT TIBIAL VEIN: ICD-10-CM

## 2023-07-06 PROCEDURE — 93970 EXTREMITY STUDY: CPT

## 2023-07-06 PROCEDURE — 99204 OFFICE O/P NEW MOD 45 MIN: CPT

## 2023-07-06 RX ORDER — APIXABAN 5 MG/1
5 TABLET, FILM COATED ORAL
Qty: 60 | Refills: 6 | Status: ACTIVE | COMMUNITY
Start: 2023-07-06 | End: 1900-01-01

## 2023-07-06 NOTE — ASSESSMENT
[FreeTextEntry1] : The patient is a 53-year-old female status post fall with acute pulmonary embolism and bilateral gastroc vein thrombosis.  The patient has given me a history of MTHFR genetic mutation.  She is currently on Eliquis.  I recommend she remain on anticoagulation for at least a total of 6 months. I recommend she follow-up with hematology for a hypercoagulable work-up.  I informed the patient she need to remain on long-term anticoagulation.  I will see her back in my office in 5 months time or sooner if any new symptoms develop.  I recommend compression stocking therapy at 20 to 30 mmHg compression knee-high to be worn daily.

## 2023-07-06 NOTE — HISTORY OF PRESENT ILLNESS
[FreeTextEntry1] : Ms. Bermudez is a 53 year old female who developed a PE s/p syncope and fall. She fell on her left side and injured her shoulder. She had a venous duplex which demonstrated gastrocnemius thrombosis. She is feeling well today with no shortness of breath or chest pain. She is currently on Eliquis.

## 2023-07-13 ENCOUNTER — APPOINTMENT (OUTPATIENT)
Dept: ORTHOPEDIC SURGERY | Facility: CLINIC | Age: 53
End: 2023-07-13

## 2023-07-14 ENCOUNTER — APPOINTMENT (OUTPATIENT)
Dept: ORTHOPEDIC SURGERY | Facility: CLINIC | Age: 53
End: 2023-07-14

## 2023-07-20 ENCOUNTER — APPOINTMENT (OUTPATIENT)
Dept: INTERNAL MEDICINE | Facility: CLINIC | Age: 53
End: 2023-07-20

## 2023-10-11 ENCOUNTER — APPOINTMENT (OUTPATIENT)
Dept: HEMATOLOGY ONCOLOGY | Facility: CLINIC | Age: 53
End: 2023-10-11

## 2023-10-24 ENCOUNTER — APPOINTMENT (OUTPATIENT)
Dept: VASCULAR SURGERY | Facility: CLINIC | Age: 53
End: 2023-10-24

## 2024-07-09 ENCOUNTER — APPOINTMENT (OUTPATIENT)
Dept: ORTHOPEDIC SURGERY | Facility: CLINIC | Age: 54
End: 2024-07-09
Payer: MEDICARE

## 2024-07-09 DIAGNOSIS — M67.411 GANGLION, RIGHT SHOULDER: ICD-10-CM

## 2024-07-09 PROCEDURE — 73030 X-RAY EXAM OF SHOULDER: CPT | Mod: RT

## 2024-07-09 PROCEDURE — 99213 OFFICE O/P EST LOW 20 MIN: CPT

## 2024-07-30 ENCOUNTER — APPOINTMENT (OUTPATIENT)
Dept: ORTHOPEDIC SURGERY | Facility: CLINIC | Age: 54
End: 2024-07-30
Payer: MEDICARE

## 2024-07-30 VITALS — HEIGHT: 66 IN | WEIGHT: 250 LBS | BODY MASS INDEX: 40.18 KG/M2

## 2024-07-30 DIAGNOSIS — M25.511 PAIN IN RIGHT SHOULDER: ICD-10-CM

## 2024-07-30 DIAGNOSIS — S49.92XA UNSPECIFIED INJURY OF LEFT SHOULDER AND UPPER ARM, INITIAL ENCOUNTER: ICD-10-CM

## 2024-07-30 PROCEDURE — 99213 OFFICE O/P EST LOW 20 MIN: CPT

## 2024-07-31 NOTE — HISTORY OF PRESENT ILLNESS
[de-identified] : Patient is here for evaluation of right shoulder pain Affecting quality of life Has pain and weakness with loss of rom Wakes up at night due to pain  NAD Right shoulder: TTP ant GH joint, bicipital groove FF 0-140 (passive 175) ER 40 IR L5 Pain with terminal rom Weakness to abduction and ER Pos Impingement Pos Ramires Pos Cross Arm Adduction Negative instability Positive scapula dyskinesia  XRay right shoulder negative for fracture, dislocation, arthritis  Plan I had a long talk with the patient about treatment options.  I reviewed the imaging.  She will continue conservative management.  We spoke about potential rotator cuff injury.  Will follow-up and see me in 6 weeks.  Will start physical therapy, home exercises, pain control.

## 2024-09-03 ENCOUNTER — APPOINTMENT (OUTPATIENT)
Dept: VASCULAR SURGERY | Facility: CLINIC | Age: 54
End: 2024-09-03
Payer: MEDICARE

## 2024-09-03 ENCOUNTER — TRANSCRIPTION ENCOUNTER (OUTPATIENT)
Age: 54
End: 2024-09-03

## 2024-09-03 VITALS
WEIGHT: 218 LBS | HEIGHT: 66 IN | SYSTOLIC BLOOD PRESSURE: 178 MMHG | HEART RATE: 79 BPM | DIASTOLIC BLOOD PRESSURE: 100 MMHG | BODY MASS INDEX: 35.03 KG/M2

## 2024-09-03 DIAGNOSIS — M79.89 OTHER SPECIFIED SOFT TISSUE DISORDERS: ICD-10-CM

## 2024-09-03 PROCEDURE — 99213 OFFICE O/P EST LOW 20 MIN: CPT

## 2024-09-03 PROCEDURE — 93970 EXTREMITY STUDY: CPT

## 2024-09-03 NOTE — ASSESSMENT
[FreeTextEntry1] : The patient is a 54-year-old female with a history of DVT and PE in June 2024.  The patient has not followed up with pulmonary or hematology.  She has a history of MTHFR factor mutation.  I recommend the patient follow-up with pulmonary and hematology.  From my standpoint she has been sufficiently treated with anticoagulation with regards to her DVT however she will require a hypercoagulable workup and the recommendation from pulmonary prior to discontinuing her anticoagulation.  I have informed the patient her anticoagulation can be held for 3 days to obtain the proper blood work for hypercoagulable workup.  I will see her back in my office in 6 months time or sooner if any new symptoms develop.    I, Dr. Dontae Sykes, personally performed the evaluation and management (E/M) services for this established patient who presents today with (a) new problem(s)/exacerbation of (an) existing condition(s). That E/M includes conducting the clinically appropriate interval history &/or exam, assessing all new/exacerbated conditions, and establishing a new plan of care. Today, my LEONIDES, Mona Brito PA-C, was here to observe my evaluation and management service for this new problem/exacerbated condition and follow the plan of care established by me going forward.

## 2024-09-03 NOTE — DATA REVIEWED
[FreeTextEntry1] : Venous duplex shows right there is no evidence of acute deep venous thrombosis or superficial thrombophlebitis there is chronic changes with residual thrombus seen in the popliteal below the knee and gastrocnemius veins.  All of the major veins are patent and compressible.  Left there is no evidence of acute deep venous thrombosis superficial thrombophlebitis all of the major veins are patent and compressible

## 2024-09-03 NOTE — HISTORY OF PRESENT ILLNESS
[FreeTextEntry1] : Ms. Bermudez is a 54 year old female who developed a PE s/p syncope and fall. She fell on her left side and injured her shoulder. She had a venous duplex which demonstrated gastrocnemius/pop thrombosis. She is feeling well today with no shortness of breath or chest pain. She is currently on Eliquis since June 2024.  The patient has not followed up with pulmonary or hematology.  She has a history of MTHFR genetic mutation in which she has known about for the past 20 years. The patient has had multiple orthopedic procedures in the past including bilateral total hip replacements and knee surgery and has never had a DVT in the past.

## 2024-09-19 ENCOUNTER — APPOINTMENT (OUTPATIENT)
Dept: PULMONOLOGY | Facility: CLINIC | Age: 54
End: 2024-09-19

## 2024-10-09 ENCOUNTER — APPOINTMENT (OUTPATIENT)
Age: 54
End: 2024-10-09
Payer: MEDICARE

## 2024-10-09 ENCOUNTER — OUTPATIENT (OUTPATIENT)
Dept: OUTPATIENT SERVICES | Facility: HOSPITAL | Age: 54
LOS: 1 days | End: 2024-10-09
Payer: MEDICARE

## 2024-10-09 VITALS
HEIGHT: 66 IN | RESPIRATION RATE: 16 BRPM | DIASTOLIC BLOOD PRESSURE: 100 MMHG | OXYGEN SATURATION: 100 % | WEIGHT: 222 LBS | HEART RATE: 80 BPM | TEMPERATURE: 98.4 F | SYSTOLIC BLOOD PRESSURE: 160 MMHG | BODY MASS INDEX: 35.68 KG/M2

## 2024-10-09 DIAGNOSIS — Z96.651 PRESENCE OF RIGHT ARTIFICIAL KNEE JOINT: Chronic | ICD-10-CM

## 2024-10-09 DIAGNOSIS — Z78.9 OTHER SPECIFIED HEALTH STATUS: ICD-10-CM

## 2024-10-09 DIAGNOSIS — Z80.1 FAMILY HISTORY OF MALIGNANT NEOPLASM OF TRACHEA, BRONCHUS AND LUNG: ICD-10-CM

## 2024-10-09 DIAGNOSIS — Z94.0 KIDNEY TRANSPLANT STATUS: Chronic | ICD-10-CM

## 2024-10-09 DIAGNOSIS — Z96.652 PRESENCE OF LEFT ARTIFICIAL KNEE JOINT: Chronic | ICD-10-CM

## 2024-10-09 DIAGNOSIS — I82.442 ACUTE EMBOLISM AND THROMBOSIS OF LEFT TIBIAL VEIN: ICD-10-CM

## 2024-10-09 DIAGNOSIS — I26.92 SADDLE EMBOLUS OF PULMONARY ARTERY W/OUT ACUTE COR PULMONALE: ICD-10-CM

## 2024-10-09 PROCEDURE — 99204 OFFICE O/P NEW MOD 45 MIN: CPT

## 2024-10-09 RX ORDER — APIXABAN 2.5 MG/1
2.5 TABLET, FILM COATED ORAL
Qty: 180 | Refills: 3 | Status: ACTIVE | COMMUNITY
Start: 2024-10-09 | End: 1900-01-01

## 2024-10-10 DIAGNOSIS — I82.442 ACUTE EMBOLISM AND THROMBOSIS OF LEFT TIBIAL VEIN: ICD-10-CM

## 2024-10-29 ENCOUNTER — APPOINTMENT (OUTPATIENT)
Dept: ORTHOPEDIC SURGERY | Facility: CLINIC | Age: 54
End: 2024-10-29
Payer: MEDICARE

## 2024-10-29 DIAGNOSIS — M25.511 PAIN IN RIGHT SHOULDER: ICD-10-CM

## 2024-10-29 DIAGNOSIS — S49.92XA UNSPECIFIED INJURY OF LEFT SHOULDER AND UPPER ARM, INITIAL ENCOUNTER: ICD-10-CM

## 2024-10-29 PROCEDURE — 99214 OFFICE O/P EST MOD 30 MIN: CPT | Mod: 25

## 2024-10-29 PROCEDURE — 20612 ASPIRATE/INJ GANGLION CYST: CPT | Mod: RT

## 2024-11-19 ENCOUNTER — APPOINTMENT (OUTPATIENT)
Dept: PULMONOLOGY | Facility: CLINIC | Age: 54
End: 2024-11-19
Payer: MEDICARE

## 2024-11-19 VITALS
HEART RATE: 80 BPM | OXYGEN SATURATION: 99 % | WEIGHT: 220 LBS | DIASTOLIC BLOOD PRESSURE: 100 MMHG | BODY MASS INDEX: 35.36 KG/M2 | HEIGHT: 66 IN | SYSTOLIC BLOOD PRESSURE: 155 MMHG | RESPIRATION RATE: 14 BRPM

## 2024-11-19 DIAGNOSIS — I82.442 ACUTE EMBOLISM AND THROMBOSIS OF LEFT TIBIAL VEIN: ICD-10-CM

## 2024-11-19 DIAGNOSIS — I26.92 SADDLE EMBOLUS OF PULMONARY ARTERY W/OUT ACUTE COR PULMONALE: ICD-10-CM

## 2024-11-19 PROCEDURE — 99203 OFFICE O/P NEW LOW 30 MIN: CPT

## 2024-12-10 ENCOUNTER — APPOINTMENT (OUTPATIENT)
Dept: VASCULAR SURGERY | Facility: CLINIC | Age: 54
End: 2024-12-10

## 2025-01-14 ENCOUNTER — APPOINTMENT (OUTPATIENT)
Dept: ORTHOPEDIC SURGERY | Facility: CLINIC | Age: 55
End: 2025-01-14
Payer: MEDICARE

## 2025-01-14 DIAGNOSIS — M67.411 GANGLION, RIGHT SHOULDER: ICD-10-CM

## 2025-01-14 DIAGNOSIS — M25.511 PAIN IN RIGHT SHOULDER: ICD-10-CM

## 2025-01-14 PROCEDURE — 99213 OFFICE O/P EST LOW 20 MIN: CPT

## 2025-01-16 ENCOUNTER — APPOINTMENT (OUTPATIENT)
Dept: MRI IMAGING | Facility: CLINIC | Age: 55
End: 2025-01-16
Payer: MEDICARE

## 2025-01-16 PROCEDURE — 73221 MRI JOINT UPR EXTREM W/O DYE: CPT | Mod: LT

## 2025-01-16 PROCEDURE — 73221 MRI JOINT UPR EXTREM W/O DYE: CPT | Mod: RT

## 2025-01-20 ENCOUNTER — APPOINTMENT (OUTPATIENT)
Dept: MRI IMAGING | Facility: CLINIC | Age: 55
End: 2025-01-20

## 2025-02-11 ENCOUNTER — APPOINTMENT (OUTPATIENT)
Dept: ORTHOPEDIC SURGERY | Facility: CLINIC | Age: 55
End: 2025-02-11
Payer: MEDICARE

## 2025-02-11 DIAGNOSIS — M25.512 PAIN IN LEFT SHOULDER: ICD-10-CM

## 2025-02-11 DIAGNOSIS — M25.511 PAIN IN RIGHT SHOULDER: ICD-10-CM

## 2025-02-11 PROCEDURE — 99214 OFFICE O/P EST MOD 30 MIN: CPT

## 2025-04-09 ENCOUNTER — APPOINTMENT (OUTPATIENT)
Age: 55
End: 2025-04-09

## 2025-04-29 NOTE — PHYSICAL THERAPY INITIAL EVALUATION ADULT - REFERRING PHYSICIAN, REHAB EVAL
Problem: Pain - Adult  Goal: Verbalizes/displays adequate comfort level or baseline comfort level  Outcome: Progressing     Problem: Safety - Adult  Goal: Free from fall injury  Outcome: Progressing   The patient's goals for the shift include Pain control    The clinical goals for the shift include pt will participate in plan of care       Sammy Ivey